# Patient Record
Sex: FEMALE | Race: WHITE
[De-identification: names, ages, dates, MRNs, and addresses within clinical notes are randomized per-mention and may not be internally consistent; named-entity substitution may affect disease eponyms.]

---

## 2022-01-18 ENCOUNTER — HOSPITAL ENCOUNTER (EMERGENCY)
Dept: HOSPITAL 94 - ER | Age: 77
LOS: 1 days | Discharge: HOME | End: 2022-01-19
Payer: MEDICARE

## 2022-01-18 VITALS — HEIGHT: 64 IN | BODY MASS INDEX: 17.79 KG/M2 | WEIGHT: 104.21 LBS

## 2022-01-18 DIAGNOSIS — Z88.8: ICD-10-CM

## 2022-01-18 DIAGNOSIS — E03.9: ICD-10-CM

## 2022-01-18 DIAGNOSIS — I11.0: Primary | ICD-10-CM

## 2022-01-18 DIAGNOSIS — I50.9: ICD-10-CM

## 2022-01-18 DIAGNOSIS — Z86.73: ICD-10-CM

## 2022-01-18 LAB
ALBUMIN SERPL BCP-MCNC: 3.6 G/DL (ref 3.4–5)
ALBUMIN/GLOB SERPL: 0.9 {RATIO} (ref 1.1–1.5)
ALP SERPL-CCNC: 120 IU/L (ref 46–116)
ALT SERPL W P-5'-P-CCNC: 60 U/L (ref 12–78)
ANION GAP SERPL CALCULATED.3IONS-SCNC: 8 MMOL/L (ref 8–16)
AST SERPL W P-5'-P-CCNC: 46 U/L (ref 10–37)
BASOPHILS # BLD AUTO: 0.1 X10'3 (ref 0–0.2)
BASOPHILS NFR BLD AUTO: 1.7 % (ref 0–1)
BILIRUB SERPL-MCNC: 0.7 MG/DL (ref 0.1–1)
BUN SERPL-MCNC: 25 MG/DL (ref 7–18)
BUN/CREAT SERPL: 21.9 (ref 6.6–38)
CALCIUM SERPL-MCNC: 8.9 MG/DL (ref 8.5–10.1)
CHLORIDE SERPL-SCNC: 102 MMOL/L (ref 99–107)
CO2 SERPL-SCNC: 28.5 MMOL/L (ref 24–32)
CREAT SERPL-MCNC: 1.14 MG/DL (ref 0.4–0.9)
EOSINOPHIL # BLD AUTO: 0.2 X10'3 (ref 0–0.9)
EOSINOPHIL NFR BLD AUTO: 5.3 % (ref 0–6)
ERYTHROCYTE [DISTWIDTH] IN BLOOD BY AUTOMATED COUNT: 17.1 % (ref 11.5–14.5)
GFR SERPL CREATININE-BSD FRML MDRD: 46 ML/MIN
GLUCOSE SERPL-MCNC: 115 MG/DL (ref 70–104)
HCT VFR BLD AUTO: 43.9 % (ref 35–45)
HGB BLD-MCNC: 14.4 G/DL (ref 12–16)
LYMPHOCYTES # BLD AUTO: 0.9 X10'3 (ref 1.1–4.8)
LYMPHOCYTES NFR BLD AUTO: 21.4 % (ref 21–51)
MCH RBC QN AUTO: 31.3 PG (ref 27–31)
MCHC RBC AUTO-ENTMCNC: 32.7 G/DL (ref 33–36.5)
MCV RBC AUTO: 95.6 FL (ref 78–98)
MONOCYTES # BLD AUTO: 0.6 X10'3 (ref 0–0.9)
MONOCYTES NFR BLD AUTO: 14.1 % (ref 2–12)
NEUTROPHILS # BLD AUTO: 2.4 X10'3 (ref 1.8–7.7)
NEUTROPHILS NFR BLD AUTO: 57.5 % (ref 42–75)
PLATELET # BLD AUTO: 133 X10'3 (ref 140–440)
PMV BLD AUTO: 11.5 FL (ref 7.4–10.4)
POTASSIUM SERPL-SCNC: 4.8 MMOL/L (ref 3.5–5.1)
PROT SERPL-MCNC: 7.5 G/DL (ref 6.4–8.2)
RBC # BLD AUTO: 4.59 X10'6 (ref 4.2–5.6)
SODIUM SERPL-SCNC: 138 MMOL/L (ref 135–145)
WBC # BLD AUTO: 4.2 X10'3 (ref 4.5–11)

## 2022-01-18 PROCEDURE — 36415 COLL VENOUS BLD VENIPUNCTURE: CPT

## 2022-01-18 PROCEDURE — 83880 ASSAY OF NATRIURETIC PEPTIDE: CPT

## 2022-01-18 PROCEDURE — 71045 X-RAY EXAM CHEST 1 VIEW: CPT

## 2022-01-18 PROCEDURE — 93005 ELECTROCARDIOGRAM TRACING: CPT

## 2022-01-18 PROCEDURE — 85025 COMPLETE CBC W/AUTO DIFF WBC: CPT

## 2022-01-18 PROCEDURE — 85008 BL SMEAR W/O DIFF WBC COUNT: CPT

## 2022-01-18 PROCEDURE — 84484 ASSAY OF TROPONIN QUANT: CPT

## 2022-01-18 PROCEDURE — 99285 EMERGENCY DEPT VISIT HI MDM: CPT

## 2022-01-18 PROCEDURE — 99283 EMERGENCY DEPT VISIT LOW MDM: CPT

## 2022-01-18 PROCEDURE — 80053 COMPREHEN METABOLIC PANEL: CPT

## 2022-01-19 VITALS — SYSTOLIC BLOOD PRESSURE: 123 MMHG | DIASTOLIC BLOOD PRESSURE: 79 MMHG

## 2022-01-19 LAB
ANISOCYTOSIS BLD QL SMEAR: (no result)
GIANT PLATELETS BLD QL SMEAR: (no result)
LG PLATELETS BLD QL SMEAR: (no result)
MACROCYTES BLD QL SMEAR: (no result)
PLATELET BLD QL SMEAR: (no result)
RBC MORPH BLD: (no result)

## 2022-01-20 ENCOUNTER — HOSPITAL ENCOUNTER (INPATIENT)
Dept: HOSPITAL 94 - ER | Age: 77
LOS: 2 days | Discharge: HOME | DRG: 175 | End: 2022-01-22
Attending: FAMILY MEDICINE | Admitting: FAMILY MEDICINE
Payer: MEDICARE

## 2022-01-20 VITALS — BODY MASS INDEX: 17.79 KG/M2 | HEIGHT: 64 IN | WEIGHT: 104.21 LBS

## 2022-01-20 VITALS — DIASTOLIC BLOOD PRESSURE: 82 MMHG | SYSTOLIC BLOOD PRESSURE: 120 MMHG

## 2022-01-20 DIAGNOSIS — Z90.12: ICD-10-CM

## 2022-01-20 DIAGNOSIS — Z85.3: ICD-10-CM

## 2022-01-20 DIAGNOSIS — I50.23: ICD-10-CM

## 2022-01-20 DIAGNOSIS — Z95.810: ICD-10-CM

## 2022-01-20 DIAGNOSIS — I26.99: Primary | ICD-10-CM

## 2022-01-20 DIAGNOSIS — Z86.73: ICD-10-CM

## 2022-01-20 DIAGNOSIS — Z85.72: ICD-10-CM

## 2022-01-20 DIAGNOSIS — Z92.21: ICD-10-CM

## 2022-01-20 DIAGNOSIS — Z91.041: ICD-10-CM

## 2022-01-20 DIAGNOSIS — Z20.822: ICD-10-CM

## 2022-01-20 DIAGNOSIS — Z88.8: ICD-10-CM

## 2022-01-20 DIAGNOSIS — Z92.3: ICD-10-CM

## 2022-01-20 DIAGNOSIS — I25.10: ICD-10-CM

## 2022-01-20 DIAGNOSIS — J96.01: ICD-10-CM

## 2022-01-20 DIAGNOSIS — I42.0: ICD-10-CM

## 2022-01-20 DIAGNOSIS — N18.9: ICD-10-CM

## 2022-01-20 DIAGNOSIS — Z95.5: ICD-10-CM

## 2022-01-20 DIAGNOSIS — I13.0: ICD-10-CM

## 2022-01-20 LAB
ALBUMIN SERPL BCP-MCNC: 3.5 G/DL (ref 3.4–5)
ALBUMIN/GLOB SERPL: 1 {RATIO} (ref 1.1–1.5)
ALP SERPL-CCNC: 112 IU/L (ref 46–116)
ALT SERPL W P-5'-P-CCNC: 57 U/L (ref 12–78)
ANION GAP SERPL CALCULATED.3IONS-SCNC: 7 MMOL/L (ref 8–16)
ANISOCYTOSIS BLD QL SMEAR: (no result)
AST SERPL W P-5'-P-CCNC: 46 U/L (ref 10–37)
BASOPHILS # BLD AUTO: 0.1 X10'3 (ref 0–0.2)
BASOPHILS NFR BLD AUTO: 2 % (ref 0–1)
BILIRUB SERPL-MCNC: 0.9 MG/DL (ref 0.1–1)
BUN SERPL-MCNC: 27 MG/DL (ref 7–18)
BUN/CREAT SERPL: 24.1 (ref 6.6–38)
BURR CELLS BLD QL SMEAR: (no result)
CALCIUM SERPL-MCNC: 8.8 MG/DL (ref 8.5–10.1)
CHLORIDE SERPL-SCNC: 99 MMOL/L (ref 99–107)
CO2 SERPL-SCNC: 27.8 MMOL/L (ref 24–32)
CREAT SERPL-MCNC: 1.12 MG/DL (ref 0.4–0.9)
D DIMER PPP FEU-MCNC: 8.16 MG/L FEU (ref 0–0.5)
EOSINOPHIL # BLD AUTO: 0.1 X10'3 (ref 0–0.9)
EOSINOPHIL NFR BLD AUTO: 2.9 % (ref 0–6)
ERYTHROCYTE [DISTWIDTH] IN BLOOD BY AUTOMATED COUNT: 16.9 % (ref 11.5–14.5)
GFR SERPL CREATININE-BSD FRML MDRD: 47 ML/MIN
GIANT PLATELETS BLD QL SMEAR: (no result)
GLUCOSE SERPL-MCNC: 119 MG/DL (ref 70–104)
HCT VFR BLD AUTO: 42.6 % (ref 35–45)
HGB BLD-MCNC: 14.1 G/DL (ref 12–16)
LG PLATELETS BLD QL SMEAR: (no result)
LYMPHOCYTES # BLD AUTO: 1 X10'3 (ref 1.1–4.8)
LYMPHOCYTES NFR BLD AUTO: 25.4 % (ref 21–51)
MAGNESIUM SERPL-MCNC: 2.1 MG/DL (ref 1.5–2.4)
MCH RBC QN AUTO: 31.4 PG (ref 27–31)
MCHC RBC AUTO-ENTMCNC: 33 G/DL (ref 33–36.5)
MCV RBC AUTO: 94.9 FL (ref 78–98)
MONOCYTES # BLD AUTO: 0.4 X10'3 (ref 0–0.9)
MONOCYTES NFR BLD AUTO: 11.1 % (ref 2–12)
NEUTROPHILS # BLD AUTO: 2.3 X10'3 (ref 1.8–7.7)
NEUTROPHILS NFR BLD AUTO: 58.6 % (ref 42–75)
OVALOCYTES BLD QL SMEAR: (no result)
PLATELET # BLD AUTO: 132 X10'3 (ref 140–440)
PLATELET BLD QL SMEAR: (no result)
PMV BLD AUTO: 11.8 FL (ref 7.4–10.4)
POTASSIUM SERPL-SCNC: 4.8 MMOL/L (ref 3.5–5.1)
POTASSIUM SERPL-SCNC: 4.9 MMOL/L (ref 3.5–5.1)
PROT SERPL-MCNC: 6.9 G/DL (ref 6.4–8.2)
RBC # BLD AUTO: 4.49 X10'6 (ref 4.2–5.6)
RBC MORPH BLD: (no result)
SCHISTOCYTES BLD QL SMEAR: (no result)
SODIUM SERPL-SCNC: 134 MMOL/L (ref 135–145)
WBC # BLD AUTO: 3.9 X10'3 (ref 4.5–11)

## 2022-01-20 PROCEDURE — 87635 SARS-COV-2 COVID-19 AMP PRB: CPT

## 2022-01-20 PROCEDURE — B32T1ZZ COMPUTERIZED TOMOGRAPHY (CT SCAN) OF LEFT PULMONARY ARTERY USING LOW OSMOLAR CONTRAST: ICD-10-PCS

## 2022-01-20 PROCEDURE — 84443 ASSAY THYROID STIM HORMONE: CPT

## 2022-01-20 PROCEDURE — 85025 COMPLETE CBC W/AUTO DIFF WBC: CPT

## 2022-01-20 PROCEDURE — 93306 TTE W/DOPPLER COMPLETE: CPT

## 2022-01-20 PROCEDURE — 93005 ELECTROCARDIOGRAM TRACING: CPT

## 2022-01-20 PROCEDURE — 85008 BL SMEAR W/O DIFF WBC COUNT: CPT

## 2022-01-20 PROCEDURE — 80048 BASIC METABOLIC PNL TOTAL CA: CPT

## 2022-01-20 PROCEDURE — 84484 ASSAY OF TROPONIN QUANT: CPT

## 2022-01-20 PROCEDURE — 71045 X-RAY EXAM CHEST 1 VIEW: CPT

## 2022-01-20 PROCEDURE — 99285 EMERGENCY DEPT VISIT HI MDM: CPT

## 2022-01-20 PROCEDURE — 96374 THER/PROPH/DIAG INJ IV PUSH: CPT

## 2022-01-20 PROCEDURE — 87081 CULTURE SCREEN ONLY: CPT

## 2022-01-20 PROCEDURE — 71275 CT ANGIOGRAPHY CHEST: CPT

## 2022-01-20 PROCEDURE — 36415 COLL VENOUS BLD VENIPUNCTURE: CPT

## 2022-01-20 PROCEDURE — 83735 ASSAY OF MAGNESIUM: CPT

## 2022-01-20 PROCEDURE — 85730 THROMBOPLASTIN TIME PARTIAL: CPT

## 2022-01-20 PROCEDURE — 84132 ASSAY OF SERUM POTASSIUM: CPT

## 2022-01-20 PROCEDURE — B3201ZZ COMPUTERIZED TOMOGRAPHY (CT SCAN) OF THORACIC AORTA USING LOW OSMOLAR CONTRAST: ICD-10-PCS

## 2022-01-20 PROCEDURE — B32S1ZZ COMPUTERIZED TOMOGRAPHY (CT SCAN) OF RIGHT PULMONARY ARTERY USING LOW OSMOLAR CONTRAST: ICD-10-PCS

## 2022-01-20 PROCEDURE — 80053 COMPREHEN METABOLIC PANEL: CPT

## 2022-01-20 PROCEDURE — 85379 FIBRIN DEGRADATION QUANT: CPT

## 2022-01-20 PROCEDURE — 85610 PROTHROMBIN TIME: CPT

## 2022-01-20 PROCEDURE — 83880 ASSAY OF NATRIURETIC PEPTIDE: CPT

## 2022-01-20 RX ADMIN — HEPARIN SODIUM PRN UNITS: 10000 INJECTION, SOLUTION INTRAVENOUS; SUBCUTANEOUS at 22:43

## 2022-01-20 RX ADMIN — DOCUSATE SODIUM SCH MG: 100 CAPSULE, LIQUID FILLED ORAL at 20:00

## 2022-01-20 RX ADMIN — HEPARIN SODIUM AND DEXTROSE SCH MLS/HR: 10000; 5 INJECTION INTRAVENOUS at 12:55

## 2022-01-21 VITALS — SYSTOLIC BLOOD PRESSURE: 115 MMHG | DIASTOLIC BLOOD PRESSURE: 70 MMHG

## 2022-01-21 VITALS — DIASTOLIC BLOOD PRESSURE: 52 MMHG | SYSTOLIC BLOOD PRESSURE: 137 MMHG

## 2022-01-21 VITALS — SYSTOLIC BLOOD PRESSURE: 101 MMHG | DIASTOLIC BLOOD PRESSURE: 60 MMHG

## 2022-01-21 VITALS — SYSTOLIC BLOOD PRESSURE: 98 MMHG | DIASTOLIC BLOOD PRESSURE: 62 MMHG

## 2022-01-21 VITALS — DIASTOLIC BLOOD PRESSURE: 65 MMHG | SYSTOLIC BLOOD PRESSURE: 98 MMHG

## 2022-01-21 VITALS — SYSTOLIC BLOOD PRESSURE: 106 MMHG | DIASTOLIC BLOOD PRESSURE: 68 MMHG

## 2022-01-21 LAB
ALBUMIN SERPL BCP-MCNC: 3 G/DL (ref 3.4–5)
ANION GAP SERPL CALCULATED.3IONS-SCNC: 7 MMOL/L (ref 8–16)
BASOPHILS # BLD AUTO: 0 X10'3 (ref 0–0.2)
BASOPHILS NFR BLD AUTO: 0.2 % (ref 0–1)
BUN SERPL-MCNC: 28 MG/DL (ref 7–18)
BUN/CREAT SERPL: 24.8 (ref 6.6–38)
CALCIUM SERPL-MCNC: 8.3 MG/DL (ref 8.5–10.1)
CHLORIDE SERPL-SCNC: 102 MMOL/L (ref 99–107)
CO2 SERPL-SCNC: 25.8 MMOL/L (ref 24–32)
CREAT SERPL-MCNC: 1.13 MG/DL (ref 0.4–0.9)
EOSINOPHIL # BLD AUTO: 0.1 X10'3 (ref 0–0.9)
EOSINOPHIL NFR BLD AUTO: 3.3 % (ref 0–6)
ERYTHROCYTE [DISTWIDTH] IN BLOOD BY AUTOMATED COUNT: 16.6 % (ref 11.5–14.5)
GFR SERPL CREATININE-BSD FRML MDRD: 47 ML/MIN
GLUCOSE SERPL-MCNC: 102 MG/DL (ref 70–104)
HCT VFR BLD AUTO: 38.5 % (ref 35–45)
HGB BLD-MCNC: 12.7 G/DL (ref 12–16)
LYMPHOCYTES # BLD AUTO: 1 X10'3 (ref 1.1–4.8)
LYMPHOCYTES NFR BLD AUTO: 24.1 % (ref 21–51)
MCH RBC QN AUTO: 31.3 PG (ref 27–31)
MCHC RBC AUTO-ENTMCNC: 32.9 G/DL (ref 33–36.5)
MCV RBC AUTO: 95 FL (ref 78–98)
MONOCYTES # BLD AUTO: 0.7 X10'3 (ref 0–0.9)
MONOCYTES NFR BLD AUTO: 18 % (ref 2–12)
NEUTROPHILS # BLD AUTO: 2.2 X10'3 (ref 1.8–7.7)
NEUTROPHILS NFR BLD AUTO: 54.4 % (ref 42–75)
PLATELET # BLD AUTO: 108 X10'3 (ref 140–440)
PMV BLD AUTO: 11.5 FL (ref 7.4–10.4)
POTASSIUM SERPL-SCNC: 4.8 MMOL/L (ref 3.5–5.1)
RBC # BLD AUTO: 4.05 X10'6 (ref 4.2–5.6)
SODIUM SERPL-SCNC: 135 MMOL/L (ref 135–145)
WBC # BLD AUTO: 4.1 X10'3 (ref 4.5–11)

## 2022-01-21 RX ADMIN — HEPARIN SODIUM AND DEXTROSE SCH MLS/HR: 10000; 5 INJECTION INTRAVENOUS at 12:33

## 2022-01-21 RX ADMIN — PANTOPRAZOLE SODIUM SCH MG: 40 TABLET, DELAYED RELEASE ORAL at 07:58

## 2022-01-21 RX ADMIN — LEVOTHYROXINE SODIUM SCH MCG: 88 TABLET ORAL at 07:58

## 2022-01-21 RX ADMIN — DOCUSATE SODIUM SCH MG: 100 CAPSULE, LIQUID FILLED ORAL at 19:42

## 2022-01-21 RX ADMIN — DOCUSATE SODIUM SCH MG: 100 CAPSULE, LIQUID FILLED ORAL at 08:00

## 2022-01-21 RX ADMIN — CARVEDILOL SCH MG: 6.25 TABLET, FILM COATED ORAL at 07:59

## 2022-01-21 NOTE — NUR
Call laboratory for PTT result; value unable to be resulted because level to high. Charge 
nurse notified. PTT will be ordered for 9 PM

## 2022-01-21 NOTE — NUR
Called lab on 0413 DVT PTT draw that has been "in process". They will come redraw. Cancelled 
future DVT PTT's and will time appropriately.

## 2022-01-21 NOTE — NUR
Noted pt with a low BMI of 17.9 however current documented wt of 47.27 kg 

is not scaled. No recent scaled wt hx in EMR. Pt documented with 100% PO 

intake of first meal on heart healthy diet. No documented edema or wounds 

per physical assessment. Pt appears well developed well nourished per ED 

report. Pt currently lacks a minimum of two criteria for malnutrition. Will continue to 
follow.

-------------------------------------------------------------------------------

Addendum: 01/21/22 at 1145 by Lili Villa RD

-------------------------------------------------------------------------------

Amended: Links added.

## 2022-01-21 NOTE — NUR
hep gtt

hep gtt was running at 1600 units when I assumed care of pt. Numerous attempts of DVT PTT 
per protocol with lab unable to obtain a numerical value. Based on previous DVT PTT results 
the hep should not have been running at 1600 units but rather 500 units/hr. Advised CRN. 
Turned off and will redraw in 2 hours. When starting back up will start on original 800 
units per hours and follow protocol based on lab result.

## 2022-01-21 NOTE — NUR
Problems reprioritized. Patient report given, questions answered & plan of care reviewed 
with LINO Rosenberg.

## 2022-01-22 VITALS — SYSTOLIC BLOOD PRESSURE: 111 MMHG | DIASTOLIC BLOOD PRESSURE: 74 MMHG

## 2022-01-22 VITALS — SYSTOLIC BLOOD PRESSURE: 92 MMHG | DIASTOLIC BLOOD PRESSURE: 67 MMHG

## 2022-01-22 LAB
ALBUMIN SERPL BCP-MCNC: 3 G/DL (ref 3.4–5)
ANION GAP SERPL CALCULATED.3IONS-SCNC: 7 MMOL/L (ref 8–16)
BASOPHILS # BLD AUTO: 0.1 X10'3 (ref 0–0.2)
BASOPHILS NFR BLD AUTO: 1.4 % (ref 0–1)
BUN SERPL-MCNC: 24 MG/DL (ref 7–18)
BUN/CREAT SERPL: 23.3 (ref 6.6–38)
CALCIUM SERPL-MCNC: 8.4 MG/DL (ref 8.5–10.1)
CHLORIDE SERPL-SCNC: 102 MMOL/L (ref 99–107)
CO2 SERPL-SCNC: 25.3 MMOL/L (ref 24–32)
CREAT SERPL-MCNC: 1.03 MG/DL (ref 0.4–0.9)
EOSINOPHIL # BLD AUTO: 0.1 X10'3 (ref 0–0.9)
EOSINOPHIL NFR BLD AUTO: 2.7 % (ref 0–6)
ERYTHROCYTE [DISTWIDTH] IN BLOOD BY AUTOMATED COUNT: 17.5 % (ref 11.5–14.5)
GFR SERPL CREATININE-BSD FRML MDRD: 52 ML/MIN
GLUCOSE SERPL-MCNC: 94 MG/DL (ref 70–104)
HCT VFR BLD AUTO: 40.7 % (ref 35–45)
HGB BLD-MCNC: 13.3 G/DL (ref 12–16)
LYMPHOCYTES # BLD AUTO: 1.1 X10'3 (ref 1.1–4.8)
LYMPHOCYTES NFR BLD AUTO: 23.3 % (ref 21–51)
MCH RBC QN AUTO: 31.4 PG (ref 27–31)
MCHC RBC AUTO-ENTMCNC: 32.7 G/DL (ref 33–36.5)
MCV RBC AUTO: 95.9 FL (ref 78–98)
MONOCYTES # BLD AUTO: 0.7 X10'3 (ref 0–0.9)
MONOCYTES NFR BLD AUTO: 15.8 % (ref 2–12)
NEUTROPHILS # BLD AUTO: 2.7 X10'3 (ref 1.8–7.7)
NEUTROPHILS NFR BLD AUTO: 56.8 % (ref 42–75)
PLATELET # BLD AUTO: 99 X10'3 (ref 140–440)
PMV BLD AUTO: 12.5 FL (ref 7.4–10.4)
POTASSIUM SERPL-SCNC: 4.5 MMOL/L (ref 3.5–5.1)
RBC # BLD AUTO: 4.25 X10'6 (ref 4.2–5.6)
SODIUM SERPL-SCNC: 134 MMOL/L (ref 135–145)
WBC # BLD AUTO: 4.7 X10'3 (ref 4.5–11)

## 2022-01-22 RX ADMIN — HEPARIN SODIUM PRN UNITS: 10000 INJECTION, SOLUTION INTRAVENOUS; SUBCUTANEOUS at 04:32

## 2022-01-22 RX ADMIN — DOCUSATE SODIUM SCH MG: 100 CAPSULE, LIQUID FILLED ORAL at 07:31

## 2022-01-22 RX ADMIN — PANTOPRAZOLE SODIUM SCH MG: 40 TABLET, DELAYED RELEASE ORAL at 07:33

## 2022-01-22 RX ADMIN — LEVOTHYROXINE SODIUM SCH MCG: 88 TABLET ORAL at 07:30

## 2022-01-22 RX ADMIN — CARVEDILOL SCH MG: 6.25 TABLET, FILM COATED ORAL at 07:31

## 2022-01-22 NOTE — NUR
O2 Sat at rest on room air:__95_%

     If below 89%:

Recovery O2 Sat at rest on ___LPM:___%:___% via______________(mask/nasal cannula, etc..)

No further documentation is necessary.



     If O2 Sat did not drop below 89% on room air,ambulate patient on room air.

O2 Sat while ambulating on room air:_80__%

Recovery O2 Sat while ambulating on __4_LPM:94__%

No further documentation is necessary.



If patient does not drop below 89% while ambulating, he/she does not qualify for home O2.

## 2022-01-22 NOTE — NUR
discharge instructions were given and explained to patient prior to discharge. iv access 
discontinued, with cannula tip complete and intact. patient tolerated the procedure well. 
sent home with oxygen.

## 2022-04-13 ENCOUNTER — HOSPITAL ENCOUNTER (INPATIENT)
Dept: HOSPITAL 94 - ER | Age: 77
LOS: 2 days | Discharge: HOME | DRG: 308 | End: 2022-04-15
Attending: INTERNAL MEDICINE | Admitting: INTERNAL MEDICINE
Payer: MEDICARE

## 2022-04-13 VITALS — BODY MASS INDEX: 16.25 KG/M2 | WEIGHT: 95.2 LBS | HEIGHT: 64 IN

## 2022-04-13 DIAGNOSIS — R55: ICD-10-CM

## 2022-04-13 DIAGNOSIS — E03.9: ICD-10-CM

## 2022-04-13 DIAGNOSIS — I48.91: ICD-10-CM

## 2022-04-13 DIAGNOSIS — Z87.891: ICD-10-CM

## 2022-04-13 DIAGNOSIS — Z90.12: ICD-10-CM

## 2022-04-13 DIAGNOSIS — Z95.810: ICD-10-CM

## 2022-04-13 DIAGNOSIS — Z85.72: ICD-10-CM

## 2022-04-13 DIAGNOSIS — Z88.5: ICD-10-CM

## 2022-04-13 DIAGNOSIS — Z92.21: ICD-10-CM

## 2022-04-13 DIAGNOSIS — I11.0: ICD-10-CM
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
## 2022-04-13 DIAGNOSIS — F32.A: ICD-10-CM

## 2022-04-13 DIAGNOSIS — Z85.3: ICD-10-CM

## 2022-04-13 DIAGNOSIS — Z88.8: ICD-10-CM

## 2022-04-13 DIAGNOSIS — Z86.73: ICD-10-CM

## 2022-04-13 DIAGNOSIS — Z79.899: ICD-10-CM

## 2022-04-13 DIAGNOSIS — E78.5: ICD-10-CM

## 2022-04-13 DIAGNOSIS — Z95.5: ICD-10-CM

## 2022-04-13 DIAGNOSIS — R00.1: Primary | ICD-10-CM

## 2022-04-13 DIAGNOSIS — I50.23: ICD-10-CM

## 2022-04-13 DIAGNOSIS — T50.905A: ICD-10-CM

## 2022-04-13 LAB
ALBUMIN SERPL BCP-MCNC: 4 G/DL (ref 3.4–5)
ALBUMIN/GLOB SERPL: 1.1 {RATIO} (ref 1.1–1.5)
ALP SERPL-CCNC: 93 IU/L (ref 46–116)
ALT SERPL W P-5'-P-CCNC: 51 U/L (ref 12–78)
ANION GAP SERPL CALCULATED.3IONS-SCNC: 10 MMOL/L (ref 8–16)
ANISOCYTOSIS BLD QL SMEAR: (no result)
AST SERPL W P-5'-P-CCNC: 32 U/L (ref 10–37)
BASOPHILS # BLD AUTO: 0 X10'3 (ref 0–0.2)
BASOPHILS NFR BLD AUTO: 0.9 % (ref 0–1)
BILIRUB SERPL-MCNC: 0.6 MG/DL (ref 0.1–1)
BUN SERPL-MCNC: 43 MG/DL (ref 7–18)
BUN/CREAT SERPL: 47.3 (ref 6.6–38)
BURR CELLS BLD QL SMEAR: (no result)
CALCIUM SERPL-MCNC: 9 MG/DL (ref 8.5–10.1)
CHLORIDE SERPL-SCNC: 100 MMOL/L (ref 99–107)
CO2 SERPL-SCNC: 28.4 MMOL/L (ref 24–32)
CREAT SERPL-MCNC: 0.91 MG/DL (ref 0.4–0.9)
EOSINOPHIL # BLD AUTO: 0.1 X10'3 (ref 0–0.9)
EOSINOPHIL NFR BLD AUTO: 1.8 % (ref 0–6)
ERYTHROCYTE [DISTWIDTH] IN BLOOD BY AUTOMATED COUNT: 17.9 % (ref 11.5–14.5)
GFR SERPL CREATININE-BSD FRML MDRD: 60 ML/MIN
GIANT PLATELETS BLD QL SMEAR: (no result)
GLUCOSE SERPL-MCNC: 89 MG/DL (ref 70–104)
HCT VFR BLD AUTO: 46.4 % (ref 35–45)
HGB BLD-MCNC: 15.3 G/DL (ref 12–16)
LG PLATELETS BLD QL SMEAR: (no result)
LYMPHOCYTES # BLD AUTO: 0.9 X10'3 (ref 1.1–4.8)
LYMPHOCYTES NFR BLD AUTO: 24.1 % (ref 21–51)
MAGNESIUM SERPL-MCNC: 2.1 MG/DL (ref 1.5–2.4)
MCH RBC QN AUTO: 30.5 PG (ref 27–31)
MCHC RBC AUTO-ENTMCNC: 32.9 G/DL (ref 33–36.5)
MCV RBC AUTO: 92.5 FL (ref 78–98)
MONOCYTES # BLD AUTO: 0.5 X10'3 (ref 0–0.9)
MONOCYTES NFR BLD AUTO: 13.1 % (ref 2–12)
NEUTROPHILS # BLD AUTO: 2.4 X10'3 (ref 1.8–7.7)
NEUTROPHILS NFR BLD AUTO: 60.1 % (ref 42–75)
OVALOCYTES BLD QL SMEAR: (no result)
PLATELET # BLD AUTO: 98 X10'3 (ref 140–440)
PLATELET BLD QL SMEAR: (no result)
PMV BLD AUTO: 11.4 FL (ref 7.4–10.4)
POTASSIUM SERPL-SCNC: 4.5 MMOL/L (ref 3.5–5.1)
POTASSIUM SERPL-SCNC: 4.9 MMOL/L (ref 3.5–5.1)
PROT SERPL-MCNC: 7.5 G/DL (ref 6.4–8.2)
RBC # BLD AUTO: 5.02 X10'6 (ref 4.2–5.6)
RBC MORPH BLD: (no result)
SCHISTOCYTES BLD QL SMEAR: (no result)
SODIUM SERPL-SCNC: 138 MMOL/L (ref 135–145)
WBC # BLD AUTO: 3.9 X10'3 (ref 4.5–11)

## 2022-04-13 PROCEDURE — 80048 BASIC METABOLIC PNL TOTAL CA: CPT

## 2022-04-13 PROCEDURE — 36415 COLL VENOUS BLD VENIPUNCTURE: CPT

## 2022-04-13 PROCEDURE — 87081 CULTURE SCREEN ONLY: CPT

## 2022-04-13 PROCEDURE — 80053 COMPREHEN METABOLIC PANEL: CPT

## 2022-04-13 PROCEDURE — 85025 COMPLETE CBC W/AUTO DIFF WBC: CPT

## 2022-04-13 PROCEDURE — 85008 BL SMEAR W/O DIFF WBC COUNT: CPT

## 2022-04-13 PROCEDURE — 84132 ASSAY OF SERUM POTASSIUM: CPT

## 2022-04-13 PROCEDURE — 71045 X-RAY EXAM CHEST 1 VIEW: CPT

## 2022-04-13 PROCEDURE — 93005 ELECTROCARDIOGRAM TRACING: CPT

## 2022-04-13 PROCEDURE — 99285 EMERGENCY DEPT VISIT HI MDM: CPT

## 2022-04-13 PROCEDURE — 83880 ASSAY OF NATRIURETIC PEPTIDE: CPT

## 2022-04-13 PROCEDURE — 84484 ASSAY OF TROPONIN QUANT: CPT

## 2022-04-13 PROCEDURE — 83735 ASSAY OF MAGNESIUM: CPT

## 2022-04-14 VITALS — SYSTOLIC BLOOD PRESSURE: 150 MMHG | DIASTOLIC BLOOD PRESSURE: 95 MMHG

## 2022-04-14 VITALS — SYSTOLIC BLOOD PRESSURE: 117 MMHG | DIASTOLIC BLOOD PRESSURE: 58 MMHG

## 2022-04-14 VITALS — DIASTOLIC BLOOD PRESSURE: 48 MMHG | SYSTOLIC BLOOD PRESSURE: 96 MMHG

## 2022-04-14 VITALS — DIASTOLIC BLOOD PRESSURE: 62 MMHG | SYSTOLIC BLOOD PRESSURE: 126 MMHG

## 2022-04-14 LAB
ALBUMIN SERPL BCP-MCNC: 3.5 G/DL (ref 3.4–5)
ANION GAP SERPL CALCULATED.3IONS-SCNC: 6 MMOL/L (ref 8–16)
BASOPHILS # BLD AUTO: 0 X10'3 (ref 0–0.2)
BASOPHILS NFR BLD AUTO: 1.1 % (ref 0–1)
BUN SERPL-MCNC: 35 MG/DL (ref 7–18)
BUN/CREAT SERPL: 41.7 (ref 6.6–38)
CALCIUM SERPL-MCNC: 8.7 MG/DL (ref 8.5–10.1)
CHLORIDE SERPL-SCNC: 107 MMOL/L (ref 99–107)
CO2 SERPL-SCNC: 28.2 MMOL/L (ref 24–32)
CREAT SERPL-MCNC: 0.84 MG/DL (ref 0.4–0.9)
EOSINOPHIL # BLD AUTO: 0.1 X10'3 (ref 0–0.9)
EOSINOPHIL NFR BLD AUTO: 2.1 % (ref 0–6)
ERYTHROCYTE [DISTWIDTH] IN BLOOD BY AUTOMATED COUNT: 17.7 % (ref 11.5–14.5)
GFR SERPL CREATININE-BSD FRML MDRD: 66 ML/MIN
GLUCOSE SERPL-MCNC: 83 MG/DL (ref 70–104)
HCT VFR BLD AUTO: 43.9 % (ref 35–45)
HGB BLD-MCNC: 14.7 G/DL (ref 12–16)
LYMPHOCYTES # BLD AUTO: 1.1 X10'3 (ref 1.1–4.8)
LYMPHOCYTES NFR BLD AUTO: 28.7 % (ref 21–51)
MAGNESIUM SERPL-MCNC: 1.9 MG/DL (ref 1.5–2.4)
MCH RBC QN AUTO: 30.8 PG (ref 27–31)
MCHC RBC AUTO-ENTMCNC: 33.4 G/DL (ref 33–36.5)
MCV RBC AUTO: 92.1 FL (ref 78–98)
MONOCYTES # BLD AUTO: 0.6 X10'3 (ref 0–0.9)
MONOCYTES NFR BLD AUTO: 15.8 % (ref 2–12)
NEUTROPHILS # BLD AUTO: 2 X10'3 (ref 1.8–7.7)
NEUTROPHILS NFR BLD AUTO: 52.3 % (ref 42–75)
PLATELET # BLD AUTO: 94 X10'3 (ref 140–440)
PMV BLD AUTO: 11.6 FL (ref 7.4–10.4)
POTASSIUM SERPL-SCNC: 4.4 MMOL/L (ref 3.5–5.1)
RBC # BLD AUTO: 4.77 X10'6 (ref 4.2–5.6)
SODIUM SERPL-SCNC: 141 MMOL/L (ref 135–145)
WBC # BLD AUTO: 3.8 X10'3 (ref 4.5–11)

## 2022-04-14 PROCEDURE — 4B02XTZ MEASUREMENT OF CARDIAC DEFIBRILLATOR, EXTERNAL APPROACH: ICD-10-PCS | Performed by: INTERNAL MEDICINE

## 2022-04-14 RX ADMIN — SACUBITRIL AND VALSARTAN SCH TABLET: 24; 26 TABLET, FILM COATED ORAL at 20:05

## 2022-04-14 RX ADMIN — LEVOTHYROXINE SODIUM SCH MCG: 88 TABLET ORAL at 10:28

## 2022-04-14 RX ADMIN — SACUBITRIL AND VALSARTAN SCH TABLET: 24; 26 TABLET, FILM COATED ORAL at 10:24

## 2022-04-15 VITALS — SYSTOLIC BLOOD PRESSURE: 93 MMHG | DIASTOLIC BLOOD PRESSURE: 51 MMHG

## 2022-04-15 VITALS — SYSTOLIC BLOOD PRESSURE: 109 MMHG | DIASTOLIC BLOOD PRESSURE: 59 MMHG

## 2022-04-15 VITALS — DIASTOLIC BLOOD PRESSURE: 57 MMHG | SYSTOLIC BLOOD PRESSURE: 104 MMHG

## 2022-04-15 LAB
ALBUMIN SERPL BCP-MCNC: 3.5 G/DL (ref 3.4–5)
ANION GAP SERPL CALCULATED.3IONS-SCNC: 6 MMOL/L (ref 8–16)
BASOPHILS # BLD AUTO: 0 X10'3 (ref 0–0.2)
BASOPHILS NFR BLD AUTO: 1.2 % (ref 0–1)
BUN SERPL-MCNC: 27 MG/DL (ref 7–18)
BUN/CREAT SERPL: 33.3 (ref 6.6–38)
CALCIUM SERPL-MCNC: 9 MG/DL (ref 8.5–10.1)
CHLORIDE SERPL-SCNC: 105 MMOL/L (ref 99–107)
CO2 SERPL-SCNC: 28.4 MMOL/L (ref 24–32)
CREAT SERPL-MCNC: 0.81 MG/DL (ref 0.4–0.9)
EOSINOPHIL # BLD AUTO: 0.1 X10'3 (ref 0–0.9)
EOSINOPHIL NFR BLD AUTO: 2.6 % (ref 0–6)
ERYTHROCYTE [DISTWIDTH] IN BLOOD BY AUTOMATED COUNT: 18 % (ref 11.5–14.5)
GFR SERPL CREATININE-BSD FRML MDRD: 69 ML/MIN
GLUCOSE SERPL-MCNC: 89 MG/DL (ref 70–104)
HCT VFR BLD AUTO: 45.8 % (ref 35–45)
HGB BLD-MCNC: 15.2 G/DL (ref 12–16)
LYMPHOCYTES # BLD AUTO: 0.8 X10'3 (ref 1.1–4.8)
LYMPHOCYTES NFR BLD AUTO: 26.9 % (ref 21–51)
MAGNESIUM SERPL-MCNC: 1.8 MG/DL (ref 1.5–2.4)
MCH RBC QN AUTO: 30.5 PG (ref 27–31)
MCHC RBC AUTO-ENTMCNC: 33.3 G/DL (ref 33–36.5)
MCV RBC AUTO: 91.6 FL (ref 78–98)
MONOCYTES # BLD AUTO: 0.5 X10'3 (ref 0–0.9)
MONOCYTES NFR BLD AUTO: 16.7 % (ref 2–12)
NEUTROPHILS # BLD AUTO: 1.7 X10'3 (ref 1.8–7.7)
NEUTROPHILS NFR BLD AUTO: 52.6 % (ref 42–75)
PLATELET # BLD AUTO: 94 X10'3 (ref 140–440)
PMV BLD AUTO: 11.7 FL (ref 7.4–10.4)
POTASSIUM SERPL-SCNC: 4.4 MMOL/L (ref 3.5–5.1)
RBC # BLD AUTO: 5 X10'6 (ref 4.2–5.6)
SODIUM SERPL-SCNC: 139 MMOL/L (ref 135–145)
WBC # BLD AUTO: 3.2 X10'3 (ref 4.5–11)

## 2022-04-15 RX ADMIN — SACUBITRIL AND VALSARTAN SCH TABLET: 24; 26 TABLET, FILM COATED ORAL at 07:49

## 2022-04-15 RX ADMIN — LEVOTHYROXINE SODIUM SCH MCG: 88 TABLET ORAL at 07:48

## 2022-04-15 NOTE — NUR
Noted pt with a low BMI of 16.3 using documented wt of 43.18 kg which is 

not scaled. Most recent wt hx in EMR is 47.27 kg 1/20/22 which is also not 

scaled. Pt documented with 50% PO intake of first meal on heart healthy diet 

though notably ate % PO intake during admit in January. Pt with no 

documented significant decrease in muscle strength or edema. Pt currently 

lacks a minimum of two criteria for malnutrition. Will continue to follow 

and make recommendations as appropriate.

-------------------------------------------------------------------------------

Addendum: 04/15/22 at 1011 by Lili Villa RD

-------------------------------------------------------------------------------

Amended: Links added.

## 2022-04-15 NOTE — NUR
Pt discharged. IV and tele monitor removed by fellow RN. Discharge packet reviewed with pt, 
, and son. Pt and family verbalized understanding and was able to ask follow-up 
questions. Pt escorted out via wheelchair with  and son.

## 2023-02-17 ENCOUNTER — HOSPITAL ENCOUNTER (INPATIENT)
Dept: HOSPITAL 94 - ER | Age: 78
LOS: 2 days | DRG: 871 | End: 2023-02-19
Attending: STUDENT IN AN ORGANIZED HEALTH CARE EDUCATION/TRAINING PROGRAM | Admitting: STUDENT IN AN ORGANIZED HEALTH CARE EDUCATION/TRAINING PROGRAM
Payer: MEDICARE

## 2023-02-17 VITALS — WEIGHT: 97.66 LBS | BODY MASS INDEX: 16.67 KG/M2 | HEIGHT: 64 IN

## 2023-02-17 VITALS — SYSTOLIC BLOOD PRESSURE: 130 MMHG | DIASTOLIC BLOOD PRESSURE: 70 MMHG

## 2023-02-17 VITALS — SYSTOLIC BLOOD PRESSURE: 136 MMHG | DIASTOLIC BLOOD PRESSURE: 71 MMHG

## 2023-02-17 VITALS — SYSTOLIC BLOOD PRESSURE: 117 MMHG | DIASTOLIC BLOOD PRESSURE: 69 MMHG

## 2023-02-17 VITALS — DIASTOLIC BLOOD PRESSURE: 82 MMHG | SYSTOLIC BLOOD PRESSURE: 138 MMHG

## 2023-02-17 VITALS — DIASTOLIC BLOOD PRESSURE: 71 MMHG | SYSTOLIC BLOOD PRESSURE: 136 MMHG

## 2023-02-17 VITALS — DIASTOLIC BLOOD PRESSURE: 68 MMHG | SYSTOLIC BLOOD PRESSURE: 130 MMHG

## 2023-02-17 VITALS — SYSTOLIC BLOOD PRESSURE: 136 MMHG | DIASTOLIC BLOOD PRESSURE: 70 MMHG

## 2023-02-17 VITALS — SYSTOLIC BLOOD PRESSURE: 135 MMHG | DIASTOLIC BLOOD PRESSURE: 74 MMHG

## 2023-02-17 DIAGNOSIS — Z95.5: ICD-10-CM

## 2023-02-17 DIAGNOSIS — A41.9: Primary | ICD-10-CM

## 2023-02-17 DIAGNOSIS — K72.00: ICD-10-CM

## 2023-02-17 DIAGNOSIS — I50.20: ICD-10-CM

## 2023-02-17 DIAGNOSIS — I25.2: ICD-10-CM

## 2023-02-17 DIAGNOSIS — E78.00: ICD-10-CM

## 2023-02-17 DIAGNOSIS — Z20.822: ICD-10-CM

## 2023-02-17 DIAGNOSIS — I48.91: ICD-10-CM

## 2023-02-17 DIAGNOSIS — I11.0: ICD-10-CM

## 2023-02-17 DIAGNOSIS — Z79.899: ICD-10-CM

## 2023-02-17 DIAGNOSIS — Z88.5: ICD-10-CM

## 2023-02-17 DIAGNOSIS — E88.09: ICD-10-CM

## 2023-02-17 DIAGNOSIS — R73.9: ICD-10-CM

## 2023-02-17 DIAGNOSIS — R57.0: ICD-10-CM

## 2023-02-17 DIAGNOSIS — E87.5: ICD-10-CM

## 2023-02-17 DIAGNOSIS — Z95.810: ICD-10-CM

## 2023-02-17 DIAGNOSIS — N17.9: ICD-10-CM

## 2023-02-17 DIAGNOSIS — I21.3: ICD-10-CM

## 2023-02-17 DIAGNOSIS — Z85.72: ICD-10-CM

## 2023-02-17 DIAGNOSIS — I25.10: ICD-10-CM

## 2023-02-17 DIAGNOSIS — R68.0: ICD-10-CM

## 2023-02-17 DIAGNOSIS — R00.1: ICD-10-CM

## 2023-02-17 DIAGNOSIS — R65.21: ICD-10-CM

## 2023-02-17 DIAGNOSIS — Z66: ICD-10-CM

## 2023-02-17 DIAGNOSIS — F17.210: ICD-10-CM

## 2023-02-17 DIAGNOSIS — Z86.711: ICD-10-CM

## 2023-02-17 DIAGNOSIS — Z85.3: ICD-10-CM

## 2023-02-17 DIAGNOSIS — I42.9: ICD-10-CM

## 2023-02-17 DIAGNOSIS — J96.01: ICD-10-CM

## 2023-02-17 DIAGNOSIS — E16.2: ICD-10-CM

## 2023-02-17 DIAGNOSIS — E87.4: ICD-10-CM

## 2023-02-17 DIAGNOSIS — Z90.49: ICD-10-CM

## 2023-02-17 DIAGNOSIS — E89.0: ICD-10-CM

## 2023-02-17 DIAGNOSIS — Z90.11: ICD-10-CM

## 2023-02-17 DIAGNOSIS — D68.9: ICD-10-CM

## 2023-02-17 DIAGNOSIS — Z51.5: ICD-10-CM

## 2023-02-17 DIAGNOSIS — Z86.73: ICD-10-CM

## 2023-02-17 LAB
ALBUMIN SERPL BCP-MCNC: 2.9 G/DL (ref 3.4–5)
ALBUMIN SERPL BCP-MCNC: 4 G/DL (ref 3.4–5)
ALBUMIN/GLOB SERPL: 1 {RATIO} (ref 1.1–1.5)
ALBUMIN/GLOB SERPL: 1.1 {RATIO} (ref 1.1–1.5)
ALP SERPL-CCNC: 103 IU/L (ref 46–116)
ALP SERPL-CCNC: 132 IU/L (ref 46–116)
ALT SERPL W P-5'-P-CCNC: 460 U/L (ref 12–78)
ALT SERPL W P-5'-P-CCNC: 696 U/L (ref 12–78)
ANION GAP SERPL CALCULATED.3IONS-SCNC: 24 MMOL/L (ref 8–16)
ANION GAP SERPL CALCULATED.3IONS-SCNC: 29 MMOL/L (ref 8–16)
ANISOCYTOSIS BLD QL SMEAR: (no result)
APTT PPP: 38 SECONDS (ref 22–32)
APTT PPP: 42 SECONDS (ref 22–32)
AST SERPL W P-5'-P-CCNC: 369 U/L (ref 10–37)
AST SERPL W P-5'-P-CCNC: 888 U/L (ref 10–37)
BASE EXCESS BLDA CALC-SCNC: -15.9 MMOL/L (ref -2–2)
BASOPHILS # BLD AUTO: 0 X10'3 (ref 0–0.2)
BASOPHILS # BLD AUTO: 0 X10'3 (ref 0–0.2)
BASOPHILS NFR BLD AUTO: 0.1 % (ref 0–1)
BASOPHILS NFR BLD AUTO: 0.7 % (ref 0–1)
BILIRUB SERPL-MCNC: 2.4 MG/DL (ref 0.1–1)
BILIRUB SERPL-MCNC: 2.5 MG/DL (ref 0.1–1)
BODY TEMPERATURE: 34.1
BUN SERPL-MCNC: 43 MG/DL (ref 7–18)
BUN SERPL-MCNC: 46 MG/DL (ref 7–18)
BUN/CREAT SERPL: 13.5 (ref 6.6–38)
BUN/CREAT SERPL: 15 (ref 6.6–38)
BURR CELLS BLD QL SMEAR: (no result)
CALCIUM SERPL-MCNC: 10.1 MG/DL (ref 8.5–10.1)
CALCIUM SERPL-MCNC: 9.5 MG/DL (ref 8.5–10.1)
CHLORIDE SERPL-SCNC: 91 MMOL/L (ref 99–107)
CHLORIDE SERPL-SCNC: 94 MMOL/L (ref 99–107)
CO2 SERPL-SCNC: 11.3 MMOL/L (ref 24–32)
CO2 SERPL-SCNC: 16.6 MMOL/L (ref 24–32)
COHGB MFR BLDA: 0.3 % (ref 0–3.9)
CREAT SERPL-MCNC: 3.07 MG/DL (ref 0.4–0.9)
CREAT SERPL-MCNC: 3.18 MG/DL (ref 0.4–0.9)
EOSINOPHIL # BLD AUTO: 0 X10'3 (ref 0–0.9)
EOSINOPHIL # BLD AUTO: 0 X10'3 (ref 0–0.9)
EOSINOPHIL NFR BLD AUTO: 0.1 % (ref 0–6)
EOSINOPHIL NFR BLD AUTO: 0.1 % (ref 0–6)
ERYTHROCYTE [DISTWIDTH] IN BLOOD BY AUTOMATED COUNT: 19.8 % (ref 11.5–14.5)
ERYTHROCYTE [DISTWIDTH] IN BLOOD BY AUTOMATED COUNT: 19.9 % (ref 11.5–14.5)
GFR SERPL CREATININE-BSD FRML MDRD: 14 ML/MIN
GFR SERPL CREATININE-BSD FRML MDRD: 15 ML/MIN
GLUCOSE SERPL-MCNC: 34 MG/DL (ref 70–104)
GLUCOSE SERPL-MCNC: 68 MG/DL (ref 70–104)
HCO3 BLDA-SCNC: 9.4 MMOL/L (ref 22–26)
HCT VFR BLD AUTO: 42.9 % (ref 35–45)
HCT VFR BLD AUTO: 50.7 % (ref 35–45)
HGB BLD-MCNC: 13.4 G/DL (ref 12–16)
HGB BLD-MCNC: 15.9 G/DL (ref 12–16)
HGB BLDA-MCNC: 13.4 G/DL (ref 12–16)
LG PLATELETS BLD QL SMEAR: (no result)
LYMPHOCYTES # BLD AUTO: 0.8 X10'3 (ref 1.1–4.8)
LYMPHOCYTES # BLD AUTO: 2.1 X10'3 (ref 1.1–4.8)
LYMPHOCYTES NFR BLD AUTO: 29.2 % (ref 21–51)
LYMPHOCYTES NFR BLD AUTO: 7 % (ref 21–51)
MACROCYTES BLD QL SMEAR: (no result)
MAGNESIUM SERPL-MCNC: 2.5 MG/DL (ref 1.5–2.4)
MCH RBC QN AUTO: 31.3 PG (ref 27–31)
MCH RBC QN AUTO: 32 PG (ref 27–31)
MCHC RBC AUTO-ENTMCNC: 31.3 G/DL (ref 33–36.5)
MCHC RBC AUTO-ENTMCNC: 31.4 G/DL (ref 33–36.5)
MCV RBC AUTO: 100.1 FL (ref 78–98)
MCV RBC AUTO: 101.9 FL (ref 78–98)
METHGB MFR BLDA: 0.4 % (ref 0–1.5)
MONOCYTES # BLD AUTO: 0.6 X10'3 (ref 0–0.9)
MONOCYTES # BLD AUTO: 1.1 X10'3 (ref 0–0.9)
MONOCYTES NFR BLD AUTO: 10.4 % (ref 2–12)
MONOCYTES NFR BLD AUTO: 8.3 % (ref 2–12)
NEUTROPHILS # BLD AUTO: 4.5 X10'3 (ref 1.8–7.7)
NEUTROPHILS # BLD AUTO: 9.1 X10'3 (ref 1.8–7.7)
NEUTROPHILS NFR BLD AUTO: 61.7 % (ref 42–75)
NEUTROPHILS NFR BLD AUTO: 82.4 % (ref 42–75)
O2/TOTAL GAS SETTING VFR VENT: 50 MMHG/%
OVALOCYTES BLD QL SMEAR: (no result)
OXYHGB MFR BLDA: 98.6 % (ref 94–97)
PCO2 TEMP ADJ BLDA: 19.4 MMHG (ref 32–45)
PEEP RESPIRATORY: 5 CM H2O
PHOSPHATE SERPL-MCNC: 8.3 MG/DL (ref 2.3–4.5)
PLATELET # BLD AUTO: 133 X10'3 (ref 140–440)
PLATELET # BLD AUTO: 144 X10'3 (ref 140–440)
PLATELET BLD QL SMEAR: (no result)
PMV BLD AUTO: 11.1 FL (ref 7.4–10.4)
PMV BLD AUTO: 12.1 FL (ref 7.4–10.4)
PO2 TEMP ADJ BLD: 207.1 MMHG (ref 75–100)
POTASSIUM SERPL-SCNC: 5.4 MMOL/L (ref 3.5–5.1)
POTASSIUM SERPL-SCNC: 6.1 MMOL/L (ref 3.5–5.1)
PROT SERPL-MCNC: 5.7 G/DL (ref 6.4–8.2)
PROT SERPL-MCNC: 7.7 G/DL (ref 6.4–8.2)
RBC # BLD AUTO: 4.29 X10'6 (ref 4.2–5.6)
RBC # BLD AUTO: 4.98 X10'6 (ref 4.2–5.6)
RBC MORPH BLD: (no result)
RESP RATE 1H: 16 B/MIN
SAO2 % BLDA: 99.3 % (ref 94–97)
SODIUM SERPL-SCNC: 132 MMOL/L (ref 135–145)
SODIUM SERPL-SCNC: 134 MMOL/L (ref 135–145)
SPONT VT COMPRES GAS VOL SET UNCOR VENT: 400 ML
WBC # BLD AUTO: 11 X10'3 (ref 4.5–11)
WBC # BLD AUTO: 7.3 X10'3 (ref 4.5–11)

## 2023-02-17 PROCEDURE — B41F1ZZ FLUOROSCOPY OF RIGHT LOWER EXTREMITY ARTERIES USING LOW OSMOLAR CONTRAST: ICD-10-PCS | Performed by: STUDENT IN AN ORGANIZED HEALTH CARE EDUCATION/TRAINING PROGRAM

## 2023-02-17 PROCEDURE — 36556 INSERT NON-TUNNEL CV CATH: CPT

## 2023-02-17 PROCEDURE — 87070 CULTURE OTHR SPECIMN AEROBIC: CPT

## 2023-02-17 PROCEDURE — 87081 CULTURE SCREEN ONLY: CPT

## 2023-02-17 PROCEDURE — 85730 THROMBOPLASTIN TIME PARTIAL: CPT

## 2023-02-17 PROCEDURE — 94660 CPAP INITIATION&MGMT: CPT

## 2023-02-17 PROCEDURE — 94002 VENT MGMT INPAT INIT DAY: CPT

## 2023-02-17 PROCEDURE — 85610 PROTHROMBIN TIME: CPT

## 2023-02-17 PROCEDURE — 85379 FIBRIN DEGRADATION QUANT: CPT

## 2023-02-17 PROCEDURE — 85018 HEMOGLOBIN: CPT

## 2023-02-17 PROCEDURE — 99285 EMERGENCY DEPT VISIT HI MDM: CPT

## 2023-02-17 PROCEDURE — 81003 URINALYSIS AUTO W/O SCOPE: CPT

## 2023-02-17 PROCEDURE — 02HV33Z INSERTION OF INFUSION DEVICE INTO SUPERIOR VENA CAVA, PERCUTANEOUS APPROACH: ICD-10-PCS

## 2023-02-17 PROCEDURE — 85025 COMPLETE CBC W/AUTO DIFF WBC: CPT

## 2023-02-17 PROCEDURE — 83880 ASSAY OF NATRIURETIC PEPTIDE: CPT

## 2023-02-17 PROCEDURE — 5A1945Z RESPIRATORY VENTILATION, 24-96 CONSECUTIVE HOURS: ICD-10-PCS

## 2023-02-17 PROCEDURE — 71045 X-RAY EXAM CHEST 1 VIEW: CPT

## 2023-02-17 PROCEDURE — 83036 HEMOGLOBIN GLYCOSYLATED A1C: CPT

## 2023-02-17 PROCEDURE — 36600 WITHDRAWAL OF ARTERIAL BLOOD: CPT

## 2023-02-17 PROCEDURE — 82803 BLOOD GASES ANY COMBINATION: CPT

## 2023-02-17 PROCEDURE — 99153 MOD SED SAME PHYS/QHP EA: CPT

## 2023-02-17 PROCEDURE — 83735 ASSAY OF MAGNESIUM: CPT

## 2023-02-17 PROCEDURE — 36415 COLL VENOUS BLD VENIPUNCTURE: CPT

## 2023-02-17 PROCEDURE — 87811 SARS-COV-2 COVID19 W/OPTIC: CPT

## 2023-02-17 PROCEDURE — 84484 ASSAY OF TROPONIN QUANT: CPT

## 2023-02-17 PROCEDURE — B2111ZZ FLUOROSCOPY OF MULTIPLE CORONARY ARTERIES USING LOW OSMOLAR CONTRAST: ICD-10-PCS | Performed by: STUDENT IN AN ORGANIZED HEALTH CARE EDUCATION/TRAINING PROGRAM

## 2023-02-17 PROCEDURE — 93005 ELECTROCARDIOGRAM TRACING: CPT

## 2023-02-17 PROCEDURE — 85384 FIBRINOGEN ACTIVITY: CPT

## 2023-02-17 PROCEDURE — 85014 HEMATOCRIT: CPT

## 2023-02-17 PROCEDURE — 82948 REAGENT STRIP/BLOOD GLUCOSE: CPT

## 2023-02-17 PROCEDURE — 5A09357 ASSISTANCE WITH RESPIRATORY VENTILATION, LESS THAN 24 CONSECUTIVE HOURS, CONTINUOUS POSITIVE AIRWAY PRESSURE: ICD-10-PCS | Performed by: INTERNAL MEDICINE

## 2023-02-17 PROCEDURE — 94003 VENT MGMT INPAT SUBQ DAY: CPT

## 2023-02-17 PROCEDURE — 0BH17EZ INSERTION OF ENDOTRACHEAL AIRWAY INTO TRACHEA, VIA NATURAL OR ARTIFICIAL OPENING: ICD-10-PCS

## 2023-02-17 PROCEDURE — 80053 COMPREHEN METABOLIC PANEL: CPT

## 2023-02-17 PROCEDURE — 81001 URINALYSIS AUTO W/SCOPE: CPT

## 2023-02-17 PROCEDURE — B548ZZA ULTRASONOGRAPHY OF SUPERIOR VENA CAVA, GUIDANCE: ICD-10-PCS

## 2023-02-17 PROCEDURE — 94760 N-INVAS EAR/PLS OXIMETRY 1: CPT

## 2023-02-17 PROCEDURE — 84100 ASSAY OF PHOSPHORUS: CPT

## 2023-02-17 PROCEDURE — 84145 PROCALCITONIN (PCT): CPT

## 2023-02-17 PROCEDURE — 80047 BASIC METABLC PNL IONIZED CA: CPT

## 2023-02-17 PROCEDURE — 4A023N7 MEASUREMENT OF CARDIAC SAMPLING AND PRESSURE, LEFT HEART, PERCUTANEOUS APPROACH: ICD-10-PCS | Performed by: STUDENT IN AN ORGANIZED HEALTH CARE EDUCATION/TRAINING PROGRAM

## 2023-02-17 PROCEDURE — 99152 MOD SED SAME PHYS/QHP 5/>YRS: CPT

## 2023-02-17 PROCEDURE — 87040 BLOOD CULTURE FOR BACTERIA: CPT

## 2023-02-17 PROCEDURE — 85007 BL SMEAR W/DIFF WBC COUNT: CPT

## 2023-02-17 PROCEDURE — 93458 L HRT ARTERY/VENTRICLE ANGIO: CPT

## 2023-02-17 PROCEDURE — 83605 ASSAY OF LACTIC ACID: CPT

## 2023-02-17 RX ADMIN — PROPOFOL SCH MLS/HR: 10 INJECTION, EMULSION INTRAVENOUS at 19:50

## 2023-02-17 RX ADMIN — Medication PRN MLS/HR: at 22:51

## 2023-02-17 RX ADMIN — Medication SCH MLS/HR: at 21:38

## 2023-02-17 NOTE — NUR
updated on patient's condition. Orders received. Patient's  at 
bedside,questions answered, update given. He went home for the evening, states he will 
return in the am.

## 2023-02-17 NOTE — NUR
Patient here from cath lab into room CICU 2007. I have received report from Pati HUYNH  and 
had the opportunity to ask questions and assume patient care. Attached to cardiac monitor,R 
femoral sheath zeroed and transduced to pressure line,attached to ventilator by RT. NG 
placed in L nare. Temp rosales catheter placed. Warm blankets applied

## 2023-02-18 VITALS — DIASTOLIC BLOOD PRESSURE: 56 MMHG | SYSTOLIC BLOOD PRESSURE: 108 MMHG

## 2023-02-18 VITALS — SYSTOLIC BLOOD PRESSURE: 116 MMHG | DIASTOLIC BLOOD PRESSURE: 59 MMHG

## 2023-02-18 VITALS — DIASTOLIC BLOOD PRESSURE: 80 MMHG | SYSTOLIC BLOOD PRESSURE: 147 MMHG

## 2023-02-18 VITALS — DIASTOLIC BLOOD PRESSURE: 83 MMHG | SYSTOLIC BLOOD PRESSURE: 180 MMHG

## 2023-02-18 VITALS — SYSTOLIC BLOOD PRESSURE: 129 MMHG | DIASTOLIC BLOOD PRESSURE: 52 MMHG

## 2023-02-18 VITALS — DIASTOLIC BLOOD PRESSURE: 50 MMHG | SYSTOLIC BLOOD PRESSURE: 102 MMHG

## 2023-02-18 VITALS — SYSTOLIC BLOOD PRESSURE: 109 MMHG | DIASTOLIC BLOOD PRESSURE: 56 MMHG

## 2023-02-18 VITALS — DIASTOLIC BLOOD PRESSURE: 68 MMHG | SYSTOLIC BLOOD PRESSURE: 136 MMHG

## 2023-02-18 VITALS — SYSTOLIC BLOOD PRESSURE: 124 MMHG | DIASTOLIC BLOOD PRESSURE: 61 MMHG

## 2023-02-18 VITALS — DIASTOLIC BLOOD PRESSURE: 59 MMHG | SYSTOLIC BLOOD PRESSURE: 128 MMHG

## 2023-02-18 VITALS — DIASTOLIC BLOOD PRESSURE: 56 MMHG | SYSTOLIC BLOOD PRESSURE: 90 MMHG

## 2023-02-18 VITALS — SYSTOLIC BLOOD PRESSURE: 78 MMHG | DIASTOLIC BLOOD PRESSURE: 37 MMHG

## 2023-02-18 VITALS — DIASTOLIC BLOOD PRESSURE: 40 MMHG | SYSTOLIC BLOOD PRESSURE: 86 MMHG

## 2023-02-18 VITALS — SYSTOLIC BLOOD PRESSURE: 107 MMHG | DIASTOLIC BLOOD PRESSURE: 55 MMHG

## 2023-02-18 VITALS — DIASTOLIC BLOOD PRESSURE: 61 MMHG | SYSTOLIC BLOOD PRESSURE: 107 MMHG

## 2023-02-18 VITALS — SYSTOLIC BLOOD PRESSURE: 124 MMHG | DIASTOLIC BLOOD PRESSURE: 47 MMHG

## 2023-02-18 VITALS — SYSTOLIC BLOOD PRESSURE: 100 MMHG | DIASTOLIC BLOOD PRESSURE: 48 MMHG

## 2023-02-18 VITALS — DIASTOLIC BLOOD PRESSURE: 47 MMHG | SYSTOLIC BLOOD PRESSURE: 93 MMHG

## 2023-02-18 VITALS — SYSTOLIC BLOOD PRESSURE: 106 MMHG | DIASTOLIC BLOOD PRESSURE: 49 MMHG

## 2023-02-18 VITALS — SYSTOLIC BLOOD PRESSURE: 108 MMHG | DIASTOLIC BLOOD PRESSURE: 52 MMHG

## 2023-02-18 VITALS — DIASTOLIC BLOOD PRESSURE: 57 MMHG | SYSTOLIC BLOOD PRESSURE: 109 MMHG

## 2023-02-18 VITALS — DIASTOLIC BLOOD PRESSURE: 48 MMHG | SYSTOLIC BLOOD PRESSURE: 97 MMHG

## 2023-02-18 VITALS — DIASTOLIC BLOOD PRESSURE: 58 MMHG | SYSTOLIC BLOOD PRESSURE: 97 MMHG

## 2023-02-18 VITALS — DIASTOLIC BLOOD PRESSURE: 47 MMHG | SYSTOLIC BLOOD PRESSURE: 92 MMHG

## 2023-02-18 VITALS — DIASTOLIC BLOOD PRESSURE: 38 MMHG | SYSTOLIC BLOOD PRESSURE: 80 MMHG

## 2023-02-18 VITALS — DIASTOLIC BLOOD PRESSURE: 72 MMHG | SYSTOLIC BLOOD PRESSURE: 135 MMHG

## 2023-02-18 VITALS — SYSTOLIC BLOOD PRESSURE: 107 MMHG | DIASTOLIC BLOOD PRESSURE: 51 MMHG

## 2023-02-18 VITALS — DIASTOLIC BLOOD PRESSURE: 62 MMHG | SYSTOLIC BLOOD PRESSURE: 106 MMHG

## 2023-02-18 VITALS — DIASTOLIC BLOOD PRESSURE: 56 MMHG | SYSTOLIC BLOOD PRESSURE: 95 MMHG

## 2023-02-18 VITALS — SYSTOLIC BLOOD PRESSURE: 117 MMHG | DIASTOLIC BLOOD PRESSURE: 56 MMHG

## 2023-02-18 VITALS — SYSTOLIC BLOOD PRESSURE: 104 MMHG | DIASTOLIC BLOOD PRESSURE: 53 MMHG

## 2023-02-18 VITALS — DIASTOLIC BLOOD PRESSURE: 54 MMHG | SYSTOLIC BLOOD PRESSURE: 90 MMHG

## 2023-02-18 VITALS — DIASTOLIC BLOOD PRESSURE: 54 MMHG | SYSTOLIC BLOOD PRESSURE: 106 MMHG

## 2023-02-18 VITALS — SYSTOLIC BLOOD PRESSURE: 105 MMHG | DIASTOLIC BLOOD PRESSURE: 54 MMHG

## 2023-02-18 VITALS — DIASTOLIC BLOOD PRESSURE: 47 MMHG | SYSTOLIC BLOOD PRESSURE: 95 MMHG

## 2023-02-18 VITALS — DIASTOLIC BLOOD PRESSURE: 53 MMHG | SYSTOLIC BLOOD PRESSURE: 96 MMHG

## 2023-02-18 VITALS — DIASTOLIC BLOOD PRESSURE: 59 MMHG | SYSTOLIC BLOOD PRESSURE: 120 MMHG

## 2023-02-18 VITALS — SYSTOLIC BLOOD PRESSURE: 114 MMHG | DIASTOLIC BLOOD PRESSURE: 58 MMHG

## 2023-02-18 VITALS — DIASTOLIC BLOOD PRESSURE: 59 MMHG | SYSTOLIC BLOOD PRESSURE: 106 MMHG

## 2023-02-18 VITALS — DIASTOLIC BLOOD PRESSURE: 57 MMHG | SYSTOLIC BLOOD PRESSURE: 115 MMHG

## 2023-02-18 VITALS — SYSTOLIC BLOOD PRESSURE: 100 MMHG | DIASTOLIC BLOOD PRESSURE: 45 MMHG

## 2023-02-18 LAB
ALBUMIN SERPL BCP-MCNC: 2 G/DL (ref 3.4–5)
ALBUMIN SERPL BCP-MCNC: 2.3 G/DL (ref 3.4–5)
ALBUMIN SERPL BCP-MCNC: 2.3 G/DL (ref 3.4–5)
ALBUMIN/GLOB SERPL: 0.9 {RATIO} (ref 1.1–1.5)
ALBUMIN/GLOB SERPL: 1 {RATIO} (ref 1.1–1.5)
ALBUMIN/GLOB SERPL: 1 {RATIO} (ref 1.1–1.5)
ALP SERPL-CCNC: 75 IU/L (ref 46–116)
ALP SERPL-CCNC: 86 IU/L (ref 46–116)
ALP SERPL-CCNC: 90 IU/L (ref 46–116)
ALT SERPL W P-5'-P-CCNC: 1581 U/L (ref 12–78)
ALT SERPL W P-5'-P-CCNC: 1657 U/L (ref 12–78)
ALT SERPL W P-5'-P-CCNC: 1779 U/L (ref 12–78)
ANION GAP SERPL CALCULATED.3IONS-SCNC: 13 MMOL/L (ref 8–16)
ANION GAP SERPL CALCULATED.3IONS-SCNC: 19 MMOL/L (ref 8–16)
ANION GAP SERPL CALCULATED.3IONS-SCNC: 9 MMOL/L (ref 8–16)
ANISOCYTOSIS BLD QL SMEAR: (no result)
APTT PPP: 40 SECONDS (ref 22–32)
APTT PPP: 40 SECONDS (ref 22–32)
AST SERPL W P-5'-P-CCNC: 2222 U/L (ref 10–37)
AST SERPL W P-5'-P-CCNC: 2357 U/L (ref 10–37)
AST SERPL W P-5'-P-CCNC: 2850 U/L (ref 10–37)
BACTERIA URNS QL MICRO: (no result) /HPF
BASE EXCESS BLDA CALC-SCNC: -0.6 MMOL/L (ref -2–2)
BASE EXCESS BLDA CALC-SCNC: 3.4 MMOL/L (ref -2–2)
BASE EXCESS BLDA CALC-SCNC: 6.3 MMOL/L (ref -2–2)
BASOPHILS # BLD AUTO: 0 X10'3 (ref 0–0.2)
BASOPHILS NFR BLD AUTO: 0.1 % (ref 0–1)
BASOPHILS NFR BLD AUTO: 0.2 % (ref 0–1)
BASOPHILS NFR BLD AUTO: 0.3 % (ref 0–1)
BILIRUB SERPL-MCNC: 1.8 MG/DL (ref 0.1–1)
BILIRUB SERPL-MCNC: 2.1 MG/DL (ref 0.1–1)
BILIRUB SERPL-MCNC: 2.7 MG/DL (ref 0.1–1)
BODY TEMPERATURE: 35.8
BODY TEMPERATURE: 36.9
BODY TEMPERATURE: 38.3
BUN SERPL-MCNC: 46 MG/DL (ref 7–18)
BUN SERPL-MCNC: 46 MG/DL (ref 7–18)
BUN SERPL-MCNC: 51 MG/DL (ref 7–18)
BUN/CREAT SERPL: 16.9 (ref 6.6–38)
BUN/CREAT SERPL: 17.7 (ref 6.6–38)
BUN/CREAT SERPL: 18.1 (ref 6.6–38)
BURR CELLS BLD QL SMEAR: (no result)
CALCIUM SERPL-MCNC: 7.4 MG/DL (ref 8.5–10.1)
CALCIUM SERPL-MCNC: 7.6 MG/DL (ref 8.5–10.1)
CALCIUM SERPL-MCNC: 8.6 MG/DL (ref 8.5–10.1)
CAOX CRY URNS QL MICRO: (no result) /HPF
CHLORIDE SERPL-SCNC: 101 MMOL/L (ref 99–107)
CHLORIDE SERPL-SCNC: 102 MMOL/L (ref 99–107)
CHLORIDE SERPL-SCNC: 93 MMOL/L (ref 99–107)
CLARITY UR: (no result)
CO2 SERPL-SCNC: 24.4 MMOL/L (ref 24–32)
CO2 SERPL-SCNC: 25.1 MMOL/L (ref 24–32)
CO2 SERPL-SCNC: 30.4 MMOL/L (ref 24–32)
COHGB MFR BLDA: 0.2 % (ref 0–3.9)
COHGB MFR BLDA: 0.3 % (ref 0–3.9)
COHGB MFR BLDA: 0.4 % (ref 0–3.9)
COLOR UR: YELLOW
CREAT SERPL-MCNC: 2.54 MG/DL (ref 0.4–0.9)
CREAT SERPL-MCNC: 2.6 MG/DL (ref 0.4–0.9)
CREAT SERPL-MCNC: 3.01 MG/DL (ref 0.4–0.9)
D DIMER PPP FEU-MCNC: 8.48 MG/L FEU (ref 0–0.5)
D DIMER PPP FEU-MCNC: 8.87 MG/L FEU (ref 0–0.5)
DEPRECATED SQUAMOUS URNS QL MICRO: (no result) /LPF
EOSINOPHIL # BLD AUTO: 0 X10'3 (ref 0–0.9)
EOSINOPHIL NFR BLD AUTO: 0 % (ref 0–6)
EOSINOPHIL NFR BLD AUTO: 0 % (ref 0–6)
EOSINOPHIL NFR BLD AUTO: 0.1 % (ref 0–6)
ERYTHROCYTE [DISTWIDTH] IN BLOOD BY AUTOMATED COUNT: 18.3 % (ref 11.5–14.5)
ERYTHROCYTE [DISTWIDTH] IN BLOOD BY AUTOMATED COUNT: 18.7 % (ref 11.5–14.5)
ERYTHROCYTE [DISTWIDTH] IN BLOOD BY AUTOMATED COUNT: 19 % (ref 11.5–14.5)
GFR SERPL CREATININE-BSD FRML MDRD: 15 ML/MIN
GFR SERPL CREATININE-BSD FRML MDRD: 18 ML/MIN
GFR SERPL CREATININE-BSD FRML MDRD: 18 ML/MIN
GLUCOSE SERPL-MCNC: 111 MG/DL (ref 70–104)
GLUCOSE SERPL-MCNC: 290 MG/DL (ref 70–104)
GLUCOSE SERPL-MCNC: 99 MG/DL (ref 70–104)
GLUCOSE UR STRIP-MCNC: NEGATIVE MG/DL
HBA1C MFR BLD: 5.8 % (ref 4.5–6.2)
HCO3 BLDA-SCNC: 20.1 MMOL/L (ref 22–26)
HCO3 BLDA-SCNC: 25.2 MMOL/L (ref 22–26)
HCO3 BLDA-SCNC: 28.1 MMOL/L (ref 22–26)
HCT VFR BLD AUTO: 34.9 % (ref 35–45)
HCT VFR BLD AUTO: 36.9 % (ref 35–45)
HCT VFR BLD AUTO: 38.9 % (ref 35–45)
HGB BLD-MCNC: 11.8 G/DL (ref 12–16)
HGB BLD-MCNC: 12.3 G/DL (ref 12–16)
HGB BLD-MCNC: 12.8 G/DL (ref 12–16)
HGB BLDA-MCNC: 12.5 G/DL (ref 12–16)
HGB BLDA-MCNC: 12.6 G/DL (ref 12–16)
HGB BLDA-MCNC: 12.6 G/DL (ref 12–16)
HGB UR QL STRIP: (no result)
HYALINE CASTS URNS QL MICRO: (no result) /LPF
KETONES UR STRIP-MCNC: NEGATIVE MG/DL
LEUKOCYTE ESTERASE UR QL STRIP: NEGATIVE
LG PLATELETS BLD QL SMEAR: (no result)
LYMPHOCYTES # BLD AUTO: 0.3 X10'3 (ref 1.1–4.8)
LYMPHOCYTES # BLD AUTO: 0.5 X10'3 (ref 1.1–4.8)
LYMPHOCYTES # BLD AUTO: 0.5 X10'3 (ref 1.1–4.8)
LYMPHOCYTES NFR BLD AUTO: 3.2 % (ref 21–51)
LYMPHOCYTES NFR BLD AUTO: 7.5 % (ref 21–51)
LYMPHOCYTES NFR BLD AUTO: 9.4 % (ref 21–51)
LYMPHOCYTES NFR BLD MANUAL: 6 % (ref 21–51)
MACROCYTES BLD QL SMEAR: (no result)
MAGNESIUM SERPL-MCNC: 1.8 MG/DL (ref 1.5–2.4)
MAGNESIUM SERPL-MCNC: 2.1 MG/DL (ref 1.5–2.4)
MCH RBC QN AUTO: 31.8 PG (ref 27–31)
MCH RBC QN AUTO: 32.1 PG (ref 27–31)
MCH RBC QN AUTO: 32.3 PG (ref 27–31)
MCHC RBC AUTO-ENTMCNC: 32.8 G/DL (ref 33–36.5)
MCHC RBC AUTO-ENTMCNC: 33.2 G/DL (ref 33–36.5)
MCHC RBC AUTO-ENTMCNC: 33.8 G/DL (ref 33–36.5)
MCV RBC AUTO: 94.9 FL (ref 78–98)
MCV RBC AUTO: 96.8 FL (ref 78–98)
MCV RBC AUTO: 97.2 FL (ref 78–98)
METAMYELOCYTES NFR BLD MANUAL: 3 % (ref 0–0)
METHGB MFR BLDA: 0.2 % (ref 0–1.5)
METHGB MFR BLDA: 0.4 % (ref 0–1.5)
METHGB MFR BLDA: 0.5 % (ref 0–1.5)
MONOCYTES # BLD AUTO: 0.5 X10'3 (ref 0–0.9)
MONOCYTES NFR BLD AUTO: 11 % (ref 2–12)
MONOCYTES NFR BLD AUTO: 4.4 % (ref 2–12)
MONOCYTES NFR BLD AUTO: 6.3 % (ref 2–12)
MONOCYTES NFR BLD MANUAL: 6 % (ref 2–12)
MYELOCYTES NFR BLD MANUAL: 1 % (ref 0–0)
NEUTROPHILS # BLD AUTO: 3.8 X10'3 (ref 1.8–7.7)
NEUTROPHILS # BLD AUTO: 6.3 X10'3 (ref 1.8–7.7)
NEUTROPHILS # BLD AUTO: 9.5 X10'3 (ref 1.8–7.7)
NEUTROPHILS NFR BLD AUTO: 79.2 % (ref 42–75)
NEUTROPHILS NFR BLD AUTO: 86.1 % (ref 42–75)
NEUTROPHILS NFR BLD AUTO: 92.2 % (ref 42–75)
NEUTS BAND # BLD MANUAL: 27 % (ref 0–10)
NEUTS SEG NFR BLD MANUAL: 57 % (ref 42–75)
NITRITE UR QL STRIP: NEGATIVE
NRBC BLD MANUAL-RTO: 4 /100WBC (ref 0–0)
O2/TOTAL GAS SETTING VFR VENT: 100 MMHG/%
O2/TOTAL GAS SETTING VFR VENT: 35 MMHG/%
O2/TOTAL GAS SETTING VFR VENT: 40 MMHG/%
OVALOCYTES BLD QL SMEAR: (no result)
OXYHGB MFR BLDA: 98.4 % (ref 94–97)
OXYHGB MFR BLDA: 98.6 % (ref 94–97)
OXYHGB MFR BLDA: 98.9 % (ref 94–97)
PCO2 TEMP ADJ BLDA: 22 MMHG (ref 32–45)
PCO2 TEMP ADJ BLDA: 31.1 MMHG (ref 32–45)
PCO2 TEMP ADJ BLDA: 31.5 MMHG (ref 32–45)
PEEP RESPIRATORY: 5 CM H2O
PEEP RESPIRATORY: 5 CM H2O
PH UR STRIP: 5 [PH] (ref 4.8–8)
PHOSPHATE SERPL-MCNC: 4.8 MG/DL (ref 2.3–4.5)
PHOSPHATE SERPL-MCNC: 6.1 MG/DL (ref 2.3–4.5)
PLATELET # BLD AUTO: 114 X10'3 (ref 140–440)
PLATELET # BLD AUTO: 123 X10'3 (ref 140–440)
PLATELET # BLD AUTO: 123 X10'3 (ref 140–440)
PLATELET # BLD AUTO: 131 X10'3 (ref 140–440)
PLATELET # BLD AUTO: 131 X10'3 (ref 140–440)
PLATELET BLD QL SMEAR: (no result)
PMV BLD AUTO: 10.9 FL (ref 7.4–10.4)
PMV BLD AUTO: 11.3 FL (ref 7.4–10.4)
PMV BLD AUTO: 11.9 FL (ref 7.4–10.4)
PO2 TEMP ADJ BLD: 140.8 MMHG (ref 75–100)
PO2 TEMP ADJ BLD: 186.1 MMHG (ref 75–100)
PO2 TEMP ADJ BLD: 487.1 MMHG (ref 75–100)
POLYCHROMASIA BLD QL SMEAR: (no result)
POTASSIUM SERPL-SCNC: 4.2 MMOL/L (ref 3.5–5.1)
POTASSIUM SERPL-SCNC: 4.4 MMOL/L (ref 3.5–5.1)
POTASSIUM SERPL-SCNC: 4.7 MMOL/L (ref 3.5–5.1)
PROT SERPL-MCNC: 4.2 G/DL (ref 6.4–8.2)
PROT SERPL-MCNC: 4.5 G/DL (ref 6.4–8.2)
PROT SERPL-MCNC: 4.7 G/DL (ref 6.4–8.2)
PROT UR QL STRIP: (no result) MG/DL
RBC # BLD AUTO: 3.67 X10'6 (ref 4.2–5.6)
RBC # BLD AUTO: 3.79 X10'6 (ref 4.2–5.6)
RBC # BLD AUTO: 4.02 X10'6 (ref 4.2–5.6)
RBC #/AREA URNS HPF: (no result) /HPF (ref 0–2)
RBC MORPH BLD: (no result)
RENAL EPI CELLS URNS QL MICRO: (no result) /HPF
RESP RATE 1H: 10 B/MIN
RESP RATE 1H: 12 B/MIN
RESP RATE 1H: 16 B/MIN
SAO2 % BLDA: 99 % (ref 94–97)
SAO2 % BLDA: 99.2 % (ref 94–97)
SAO2 % BLDA: 99.7 % (ref 94–97)
SCHISTOCYTES BLD QL SMEAR: (no result)
SMUDGE CELLS BLD QL SMEAR: (no result)
SODIUM SERPL-SCNC: 136 MMOL/L (ref 135–145)
SODIUM SERPL-SCNC: 139 MMOL/L (ref 135–145)
SODIUM SERPL-SCNC: 141 MMOL/L (ref 135–145)
SP GR UR STRIP: 1.01 (ref 1–1.03)
SPONT VT COMPRES GAS VOL SET UNCOR VENT: 400 ML
TOTAL CELLS COUNTED FLD: 100
TRANS CELLS URNS QL MICRO: (no result) /HPF
URN COLLECT METHOD CLASS: (no result)
UROBILINOGEN UR STRIP-MCNC: 0.2 E.U/DL (ref 0.2–1)
WBC # BLD AUTO: 10.3 X10'3 (ref 4.5–11)
WBC # BLD AUTO: 4.8 X10'3 (ref 4.5–11)
WBC # BLD AUTO: 7.3 X10'3 (ref 4.5–11)
WBC #/AREA URNS HPF: (no result) /HPF (ref 0–4)

## 2023-02-18 RX ADMIN — SODIUM CHLORIDE SCH MLS/HR: 9 INJECTION INTRAMUSCULAR; INTRAVENOUS; SUBCUTANEOUS at 21:30

## 2023-02-18 RX ADMIN — MINERAL OIL, PETROLATUM SCH INCH: 425; 568 OINTMENT OPHTHALMIC at 19:53

## 2023-02-18 RX ADMIN — SODIUM CHLORIDE SCH MLS/HR: 9 INJECTION INTRAMUSCULAR; INTRAVENOUS; SUBCUTANEOUS at 15:46

## 2023-02-18 RX ADMIN — Medication PRN MLS/HR: at 01:42

## 2023-02-18 RX ADMIN — INSULIN LISPRO SCH UNITS: 100 INJECTION, SOLUTION INTRAVENOUS; SUBCUTANEOUS at 08:11

## 2023-02-18 RX ADMIN — SODIUM CHLORIDE SCH MLS/HR: 0.9 INJECTION, SOLUTION INTRAVENOUS at 23:56

## 2023-02-18 RX ADMIN — AMIODARONE HYDROCHLORIDE SCH MLS/HR: 1.8 INJECTION, SOLUTION INTRAVENOUS at 14:07

## 2023-02-18 RX ADMIN — DEXTROSE MONOHYDRATE PRN ML: 25 INJECTION, SOLUTION INTRAVENOUS at 12:43

## 2023-02-18 RX ADMIN — SODIUM CHLORIDE SCH MLS/HR: 9 INJECTION INTRAMUSCULAR; INTRAVENOUS; SUBCUTANEOUS at 08:02

## 2023-02-18 RX ADMIN — Medication PRN MLS/HR: at 10:47

## 2023-02-18 RX ADMIN — AMIODARONE HYDROCHLORIDE SCH MLS/HR: 1.8 INJECTION, SOLUTION INTRAVENOUS at 19:53

## 2023-02-18 RX ADMIN — SODIUM CHLORIDE SCH MLS/HR: 9 INJECTION INTRAMUSCULAR; INTRAVENOUS; SUBCUTANEOUS at 01:30

## 2023-02-18 RX ADMIN — DOCUSATE SODIUM SCH MG: 100 CAPSULE, LIQUID FILLED ORAL at 08:00

## 2023-02-18 RX ADMIN — INSULIN LISPRO SCH UNITS: 100 INJECTION, SOLUTION INTRAVENOUS; SUBCUTANEOUS at 04:36

## 2023-02-18 RX ADMIN — DEXTROSE SCH MLS/HR: 50 INJECTION, SOLUTION INTRAVENOUS at 10:44

## 2023-02-18 RX ADMIN — DOCUSATE SODIUM SCH MG: 100 CAPSULE, LIQUID FILLED ORAL at 20:00

## 2023-02-18 RX ADMIN — DEXTROSE SCH MLS/HR: 50 INJECTION, SOLUTION INTRAVENOUS at 03:48

## 2023-02-18 RX ADMIN — Medication SCH MLS/HR: at 08:03

## 2023-02-18 RX ADMIN — Medication PRN MLS/HR: at 15:54

## 2023-02-18 RX ADMIN — PROPOFOL SCH MLS/HR: 10 INJECTION, EMULSION INTRAVENOUS at 02:56

## 2023-02-18 NOTE — NUR
pt suddenly went into vtach- fem pulse then no pulse. her aicd fired, to svt, then back to 
junctional. bp lost- levoped up to max. sats to 80's, to unmeasurable- fio2 on vent to 100%. 
face blue/purple. returned hr, bp to 180- levophed down. pts color returned- fetanyl off 
during event- remains off. dr bryan notified of event and also of labs- coags- all 
abnormal.  remains at bs- support and info given

## 2023-02-18 NOTE — NUR
update to dr bryan re labs, abg, neuro, iv gtts.  at - also updated. bicarb gtt 
off, levo increased, diprivan off for now as hr 50's and pt now breathing right with vent. 
md discussed with  and pt now dnr. pt opens eyes to speech- very light  when 
asked. pupils were dilated this am, now pinpoint- reactive. pt continues to ooze by neck and 
mouth. cl drsg removed and gauze placed til oozing stops. hr 50's- not paced- pt has aicd.

## 2023-02-18 NOTE — NUR
dr carolina valenzuela notified of event, called again as pt on amiodarone at home- gtt ordered and 
started. bs 58- d50 given- repeated 98 How Severe Is Your Acne?: mild Is This A New Presentation, Or A Follow-Up?: Acne

## 2023-02-18 NOTE — NUR
Dr Acuña notified of patient's decreased urine output and monitor changes including 
prolonged QT interval.  Orders received. Okay to run Amiodarone at maintenance dose of 
0.5mg/hr.  Titrating pressors to keep MAP greater than 65.

## 2023-02-18 NOTE — NUR
Patient in room CICU 2007. I have received report from Macy HUYNH and had the opportunity to 
ask questions and assume patient care. Patient's son and  at bedside.

## 2023-02-18 NOTE — NUR
Patient in room CICU 2007. I have received report from LOUISE and had the opportunity to 
ask questions and assume patient care.

## 2023-02-18 NOTE — NUR
Initial: Pt presented with c/o SOB, admit for STEMI and intubated for acute respiratory 
failure. Per EMR pt with an NGT in place though no TF consult a this time. Will place TF 
recs below for if expected prolonged intubation and to receive nutrition support. Per EMR pt 
receiving Propofol at 2.809 mL/hr providing 74 kcal/day. Noted pt with a low BMI, most 
recent scaled wt in EMR is 51.4 kg 11/20/22, though prior documented wt of 47.27 kg 1/20/22 
more stable with current wt. Pt possibly chronically with a low 

BMI for age. Pt with a low Heron of 12. Per EMR no edema and pt with a right groin femoral 
sheath. Noted pt having episodes of hypoglycemia. Current A1c is 5.8% with no documented PMH 
DM. Pt on glycemic protocol. Will continue to follow closely.

Recommendations:

1) IF TF, continuous Vital AF with 60 mL/hr goal rate. Begin at 20 mL/hr and advance by 20 
mL Q8H as tolerated to goal rate; monitor Propofol rate and need to adjust goal rate

2) IF TF, additional water flush per physician in view of CHF and hyponatremia on admit; 
monitor serum Na

3) IF TF, prealbumin q Monday/Thursday; daily scaled weights

4) Routine bowel care

-------------------------------------------------------------------------------

Addendum: 02/18/23 at 0930 by Lili Villa RD

-------------------------------------------------------------------------------

Amended: Links added.

## 2023-02-18 NOTE — NUR
Problems reprioritized. Patient report given, questions answered & plan of care reviewed 
with Macy HUYNH.

## 2023-02-19 VITALS — SYSTOLIC BLOOD PRESSURE: 101 MMHG | DIASTOLIC BLOOD PRESSURE: 51 MMHG

## 2023-02-19 VITALS — SYSTOLIC BLOOD PRESSURE: 100 MMHG | DIASTOLIC BLOOD PRESSURE: 51 MMHG

## 2023-02-19 VITALS — SYSTOLIC BLOOD PRESSURE: 98 MMHG | DIASTOLIC BLOOD PRESSURE: 48 MMHG

## 2023-02-19 VITALS — SYSTOLIC BLOOD PRESSURE: 104 MMHG | DIASTOLIC BLOOD PRESSURE: 52 MMHG

## 2023-02-19 VITALS — SYSTOLIC BLOOD PRESSURE: 109 MMHG | DIASTOLIC BLOOD PRESSURE: 55 MMHG

## 2023-02-19 VITALS — DIASTOLIC BLOOD PRESSURE: 48 MMHG | SYSTOLIC BLOOD PRESSURE: 97 MMHG

## 2023-02-19 VITALS — DIASTOLIC BLOOD PRESSURE: 51 MMHG | SYSTOLIC BLOOD PRESSURE: 91 MMHG

## 2023-02-19 VITALS — DIASTOLIC BLOOD PRESSURE: 49 MMHG | SYSTOLIC BLOOD PRESSURE: 99 MMHG

## 2023-02-19 VITALS — DIASTOLIC BLOOD PRESSURE: 56 MMHG | SYSTOLIC BLOOD PRESSURE: 98 MMHG

## 2023-02-19 VITALS — SYSTOLIC BLOOD PRESSURE: 99 MMHG | DIASTOLIC BLOOD PRESSURE: 61 MMHG

## 2023-02-19 VITALS — SYSTOLIC BLOOD PRESSURE: 91 MMHG | DIASTOLIC BLOOD PRESSURE: 51 MMHG

## 2023-02-19 VITALS — SYSTOLIC BLOOD PRESSURE: 101 MMHG | DIASTOLIC BLOOD PRESSURE: 59 MMHG

## 2023-02-19 VITALS — SYSTOLIC BLOOD PRESSURE: 93 MMHG | DIASTOLIC BLOOD PRESSURE: 49 MMHG

## 2023-02-19 VITALS — DIASTOLIC BLOOD PRESSURE: 67 MMHG | SYSTOLIC BLOOD PRESSURE: 105 MMHG

## 2023-02-19 VITALS — DIASTOLIC BLOOD PRESSURE: 57 MMHG | SYSTOLIC BLOOD PRESSURE: 107 MMHG

## 2023-02-19 VITALS — DIASTOLIC BLOOD PRESSURE: 66 MMHG | SYSTOLIC BLOOD PRESSURE: 106 MMHG

## 2023-02-19 LAB
ACANTHOCYTES BLD QL SMEAR: (no result)
ALBUMIN SERPL BCP-MCNC: 4.1 G/DL (ref 3.4–5)
ALBUMIN/GLOB SERPL: 2.4 {RATIO} (ref 1.1–1.5)
ALP SERPL-CCNC: 74 IU/L (ref 46–116)
ALT SERPL W P-5'-P-CCNC: 1218 U/L (ref 12–78)
ANION GAP SERPL CALCULATED.3IONS-SCNC: 26 MMOL/L (ref 8–16)
ANISOCYTOSIS BLD QL SMEAR: (no result)
APTT PPP: 61 SECONDS (ref 22–32)
AST SERPL W P-5'-P-CCNC: 1385 U/L (ref 10–37)
BASE EXCESS BLDA CALC-SCNC: -11.8 MMOL/L (ref -2–2)
BASOPHILS # BLD AUTO: 0 X10'3 (ref 0–0.2)
BASOPHILS NFR BLD AUTO: 0.1 % (ref 0–1)
BILIRUB SERPL-MCNC: 2.3 MG/DL (ref 0.1–1)
BODY TEMPERATURE: 36.7
BUN SERPL-MCNC: 45 MG/DL (ref 7–18)
BUN/CREAT SERPL: 15.1 (ref 6.6–38)
BURR CELLS BLD QL SMEAR: (no result)
CALCIUM SERPL-MCNC: 7.3 MG/DL (ref 8.5–10.1)
CHLORIDE SERPL-SCNC: 97 MMOL/L (ref 99–107)
CO2 SERPL-SCNC: 15 MMOL/L (ref 24–32)
COHGB MFR BLDA: 0.3 % (ref 0–3.9)
CREAT SERPL-MCNC: 2.99 MG/DL (ref 0.4–0.9)
D DIMER PPP FEU-MCNC: 5.4 MG/L FEU (ref 0–0.5)
DACRYOCYTES BLD QL SMEAR: (no result)
EOSINOPHIL # BLD AUTO: 0 X10'3 (ref 0–0.9)
EOSINOPHIL NFR BLD AUTO: 0 % (ref 0–6)
ERYTHROCYTE [DISTWIDTH] IN BLOOD BY AUTOMATED COUNT: 19.7 % (ref 11.5–14.5)
GFR SERPL CREATININE-BSD FRML MDRD: 15 ML/MIN
GIANT PLATELETS BLD QL SMEAR: (no result)
GLUCOSE SERPL-MCNC: 100 MG/DL (ref 70–104)
HCO3 BLDA-SCNC: 13 MMOL/L (ref 22–26)
HCT VFR BLD AUTO: 34.5 % (ref 35–45)
HGB BLD-MCNC: 11.2 G/DL (ref 12–16)
HGB BLDA-MCNC: 12.4 G/DL (ref 12–16)
LG PLATELETS BLD QL SMEAR: (no result)
LYMPHOCYTES # BLD AUTO: 0.6 X10'3 (ref 1.1–4.8)
LYMPHOCYTES NFR BLD AUTO: 8.3 % (ref 21–51)
LYMPHOCYTES NFR BLD MANUAL: 6 % (ref 21–51)
MACROCYTES BLD QL SMEAR: (no result)
MAGNESIUM SERPL-MCNC: 1.9 MG/DL (ref 1.5–2.4)
MCH RBC QN AUTO: 32.5 PG (ref 27–31)
MCHC RBC AUTO-ENTMCNC: 32.6 G/DL (ref 33–36.5)
MCV RBC AUTO: 99.6 FL (ref 78–98)
METAMYELOCYTES NFR BLD MANUAL: 1 % (ref 0–0)
METHGB MFR BLDA: 0.4 % (ref 0–1.5)
MONOCYTES # BLD AUTO: 0.5 X10'3 (ref 0–0.9)
MONOCYTES NFR BLD AUTO: 6.6 % (ref 2–12)
MONOCYTES NFR BLD MANUAL: 7 % (ref 2–12)
NEUTROPHILS # BLD AUTO: 6 X10'3 (ref 1.8–7.7)
NEUTROPHILS NFR BLD AUTO: 85 % (ref 42–75)
NEUTS BAND # BLD MANUAL: 12 % (ref 0–10)
NEUTS SEG NFR BLD MANUAL: 74 % (ref 42–75)
NRBC BLD MANUAL-RTO: 2 /100WBC (ref 0–0)
O2/TOTAL GAS SETTING VFR VENT: 100 MMHG/%
OVALOCYTES BLD QL SMEAR: (no result)
OXYHGB MFR BLDA: 99.1 % (ref 94–97)
PCO2 TEMP ADJ BLDA: 26.4 MMHG (ref 32–45)
PEEP RESPIRATORY: 5 CM H2O
PHOSPHATE SERPL-MCNC: 6.3 MG/DL (ref 2.3–4.5)
PLATELET # BLD AUTO: 106 X10'3 (ref 140–440)
PLATELET # BLD AUTO: 106 X10'3 (ref 140–440)
PLATELET BLD QL SMEAR: (no result)
PMV BLD AUTO: 12.3 FL (ref 7.4–10.4)
PO2 TEMP ADJ BLD: 364.7 MMHG (ref 75–100)
POLYCHROMASIA BLD QL SMEAR: (no result)
POTASSIUM SERPL-SCNC: 5.3 MMOL/L (ref 3.5–5.1)
PROT SERPL-MCNC: 5.8 G/DL (ref 6.4–8.2)
RBC # BLD AUTO: 3.46 X10'6 (ref 4.2–5.6)
RBC MORPH BLD: (no result)
RESP RATE 1H: 10 B/MIN
SAO2 % BLDA: 99.8 % (ref 94–97)
SODIUM SERPL-SCNC: 138 MMOL/L (ref 135–145)
SPONT VT COMPRES GAS VOL SET UNCOR VENT: 350 ML
TOTAL CELLS COUNTED FLD: 100
WBC # BLD AUTO: 7 X10'3 (ref 4.5–11)

## 2023-02-19 RX ADMIN — AMIODARONE HYDROCHLORIDE SCH MLS/HR: 1.8 INJECTION, SOLUTION INTRAVENOUS at 04:14

## 2023-02-19 RX ADMIN — MINERAL OIL, PETROLATUM SCH INCH: 425; 568 OINTMENT OPHTHALMIC at 02:00

## 2023-02-19 RX ADMIN — DEXTROSE MONOHYDRATE PRN ML: 25 INJECTION, SOLUTION INTRAVENOUS at 08:38

## 2023-02-19 RX ADMIN — Medication PRN MLS/HR: at 03:16

## 2023-02-19 RX ADMIN — SODIUM CHLORIDE SCH MLS/HR: 0.9 INJECTION, SOLUTION INTRAVENOUS at 03:18

## 2023-02-19 NOTE — NUR
Patient's rhythm bradycardic, QRS widened, irregular, requiring increased pressors to 
maintain MAP. Face dusky, veins in face distended.  Unable to obtain sats peripherally.  ABG 
obtained. Critical results and change in condition called to Dr. Acuña. Orders received.  
Patient's  contacted by MD.

## 2023-02-19 NOTE — NUR
[id number] Patient's son and  at bedside. Update given, questions answered. All 
belongings sent home with patient's . States that they will return in a few hours to 
discuss withdrawal of care.  Would like to have the  of their [employer] administer last 
rites prior to withdrawal. Problems reprioritized. Patient report given, questions answered 
& plan of care reviewed with Estefany HUYNH.

## 2023-02-19 NOTE — NUR
RN IS TO DOCUMENT YES TO ALL APPLICABLE AREAS

Pronouncement of Death:

1. Time Physician Notified: 1106

2. Date of Death: 2/19/23

3. Time of Death: 1105

4. DNR/Withdraw life support documented: Y

5. Monitor strip has been placed on chart: Y

6. Assessment process is of one-minute duration and includes following criteria:

    a) Patient is unresponsive to all stimuli: Y

    b) Pupils fixed and non-reactive: Y

    c) Auscultation of precordium reveals absence of heart tones: Y

    d) Auscultation of lungs reveals absence of breath sounds: Y

    e) Absence of blood pressure / all vital signs: Y

    f) QRS complexes are not present on monitor / EKG strip: Y

    g) Pacer spikes without capture: Y

4. Comments:

## 2023-02-20 LAB
ANION GAP BLD CALC-SCNC: 14 MMOL/L (ref 8–12)
BUN BLD-MCNC: 42 MG/DL (ref 7–18)
BUN/CREAT BLD: 12.7 (ref 6.6–38)
CA-I BLD-SCNC: 1.01 MMOL/L (ref 1.03–1.32)
CHLORIDE BLD-SCNC: 98 MMOL/L (ref 99–107)
CO2 BLD-SCNC: 17 MMOL/L (ref 24–32)
CREAT BLD-MCNC: 3.3 MG/DL (ref 0.6–1.1)
GFR SERPL CREATININE-BSD FRML MDRD: 14 ML/MIN
GLUCOSE SERPLBLD-MCNC: 63 MG/DL (ref 70–104)
HCT VFR BLD CALC: 35 %PCV (ref 35–45)
HCT VFR BLD CALC: 50 %PCV (ref 35–45)
HGB BLD CALC-MCNC: 11.9 G/DL (ref 12–16)
HGB BLD CALC-MCNC: 17 G/DL (ref 12–16)
POTASSIUM BLD-SCNC: 6.2 MMOL/L (ref 3.5–5.1)
SAO2 % BLDA FROM PO2: (no result) % (ref 95–98)
SODIUM BLD-SCNC: 129 MMOL/L (ref 135–145)
SPECIMEN SOURCE: (no result)

## 2024-01-05 NOTE — NUR
[Patient] : patient hr to 69 since diprivan off. sbp improved. pt opening eyes more with care. labs drawn. 
sputum sent.